# Patient Record
Sex: MALE | Race: WHITE | NOT HISPANIC OR LATINO | Employment: OTHER | URBAN - METROPOLITAN AREA
[De-identification: names, ages, dates, MRNs, and addresses within clinical notes are randomized per-mention and may not be internally consistent; named-entity substitution may affect disease eponyms.]

---

## 2023-10-27 ENCOUNTER — OFFICE VISIT (OUTPATIENT)
Dept: PRIMARY CARE | Facility: CLINIC | Age: 83
End: 2023-10-27
Payer: MEDICARE

## 2023-10-27 VITALS
BODY MASS INDEX: 33.49 KG/M2 | DIASTOLIC BLOOD PRESSURE: 78 MMHG | WEIGHT: 182 LBS | OXYGEN SATURATION: 97 % | HEART RATE: 52 BPM | SYSTOLIC BLOOD PRESSURE: 130 MMHG | TEMPERATURE: 98.3 F | HEIGHT: 62 IN

## 2023-10-27 DIAGNOSIS — E11.69 TYPE 2 DIABETES MELLITUS WITH OTHER SPECIFIED COMPLICATION, UNSPECIFIED WHETHER LONG TERM INSULIN USE (MULTI): Primary | ICD-10-CM

## 2023-10-27 PROBLEM — K76.0 FATTY LIVER: Status: ACTIVE | Noted: 2023-10-27

## 2023-10-27 PROBLEM — I10 HTN (HYPERTENSION): Status: ACTIVE | Noted: 2023-10-27

## 2023-10-27 PROBLEM — R60.9 EDEMA: Status: ACTIVE | Noted: 2023-10-27

## 2023-10-27 PROBLEM — K21.9 GERD (GASTROESOPHAGEAL REFLUX DISEASE): Status: ACTIVE | Noted: 2023-10-27

## 2023-10-27 PROBLEM — R20.2 NUMBNESS AND TINGLING: Status: ACTIVE | Noted: 2023-10-27

## 2023-10-27 PROBLEM — R79.89 LOW VITAMIN B12 LEVEL: Status: ACTIVE | Noted: 2023-10-27

## 2023-10-27 PROBLEM — M25.562 KNEE PAIN, LEFT: Status: ACTIVE | Noted: 2023-10-27

## 2023-10-27 PROBLEM — G56.00 CARPAL TUNNEL SYNDROME: Status: ACTIVE | Noted: 2023-10-27

## 2023-10-27 PROBLEM — J43.9 EMPHYSEMA/COPD (MULTI): Status: ACTIVE | Noted: 2023-10-27

## 2023-10-27 PROBLEM — E11.9 TYPE 2 DIABETES MELLITUS (MULTI): Status: ACTIVE | Noted: 2023-10-27

## 2023-10-27 PROBLEM — E78.5 HYPERLIPIDEMIA: Status: ACTIVE | Noted: 2023-10-27

## 2023-10-27 PROBLEM — M54.12 CERVICAL RADICULOPATHY: Status: ACTIVE | Noted: 2023-10-27

## 2023-10-27 PROBLEM — N40.0 BPH (BENIGN PROSTATIC HYPERPLASIA): Status: ACTIVE | Noted: 2023-10-27

## 2023-10-27 PROBLEM — M19.90 OSTEOARTHRITIS: Status: ACTIVE | Noted: 2023-10-27

## 2023-10-27 PROBLEM — G47.30 SLEEP APNEA: Status: ACTIVE | Noted: 2023-10-27

## 2023-10-27 PROBLEM — E55.9 VITAMIN D DEFICIENCY: Status: ACTIVE | Noted: 2023-10-27

## 2023-10-27 PROBLEM — B36.9 FUNGAL SKIN INFECTION: Status: ACTIVE | Noted: 2023-10-27

## 2023-10-27 PROBLEM — R41.3 MEMORY LOSS: Status: ACTIVE | Noted: 2023-10-27

## 2023-10-27 PROBLEM — R20.0 NUMBNESS AND TINGLING: Status: ACTIVE | Noted: 2023-10-27

## 2023-10-27 PROBLEM — M54.50 LOWER BACK PAIN: Status: ACTIVE | Noted: 2023-10-27

## 2023-10-27 PROBLEM — K43.9 HERNIA, VENTRAL: Status: ACTIVE | Noted: 2023-10-27

## 2023-10-27 LAB — POC FINGERSTICK BLOOD GLUCOSE: 112 MG/DL (ref 70–100)

## 2023-10-27 PROCEDURE — 3078F DIAST BP <80 MM HG: CPT | Performed by: INTERNAL MEDICINE

## 2023-10-27 PROCEDURE — 1125F AMNT PAIN NOTED PAIN PRSNT: CPT | Performed by: INTERNAL MEDICINE

## 2023-10-27 PROCEDURE — 1159F MED LIST DOCD IN RCRD: CPT | Performed by: INTERNAL MEDICINE

## 2023-10-27 PROCEDURE — 3075F SYST BP GE 130 - 139MM HG: CPT | Performed by: INTERNAL MEDICINE

## 2023-10-27 PROCEDURE — 82962 GLUCOSE BLOOD TEST: CPT | Performed by: INTERNAL MEDICINE

## 2023-10-27 PROCEDURE — 99213 OFFICE O/P EST LOW 20 MIN: CPT | Performed by: INTERNAL MEDICINE

## 2023-10-27 PROCEDURE — 1036F TOBACCO NON-USER: CPT | Performed by: INTERNAL MEDICINE

## 2023-10-27 RX ORDER — ALBUTEROL SULFATE 0.83 MG/ML
2.5 SOLUTION RESPIRATORY (INHALATION) EVERY 4 HOURS PRN
COMMUNITY

## 2023-10-27 RX ORDER — METFORMIN HYDROCHLORIDE 1000 MG/1
1000 TABLET ORAL
COMMUNITY
Start: 2012-02-15 | End: 2024-03-01 | Stop reason: SDUPTHER

## 2023-10-27 RX ORDER — NYSTATIN 100000 [USP'U]/G
1 POWDER TOPICAL AS NEEDED
COMMUNITY
Start: 2015-12-28

## 2023-10-27 RX ORDER — LISINOPRIL 40 MG/1
40 TABLET ORAL DAILY
COMMUNITY
Start: 2023-10-09

## 2023-10-27 RX ORDER — FENOFIBRIC ACID 135 MG/1
1 CAPSULE, DELAYED RELEASE ORAL DAILY
COMMUNITY
Start: 2022-09-01

## 2023-10-27 RX ORDER — FUROSEMIDE 40 MG/1
40 TABLET ORAL
COMMUNITY
Start: 2023-09-18

## 2023-10-27 RX ORDER — BLOOD SUGAR DIAGNOSTIC
STRIP MISCELLANEOUS
Qty: 100 EACH | Refills: 3 | Status: SHIPPED | OUTPATIENT
Start: 2023-10-27

## 2023-10-27 RX ORDER — BLOOD SUGAR DIAGNOSTIC
STRIP MISCELLANEOUS
COMMUNITY
End: 2023-10-27

## 2023-10-27 RX ORDER — ASPIRIN 81 MG/1
1 TABLET ORAL DAILY
COMMUNITY
End: 2023-12-21 | Stop reason: WASHOUT

## 2023-10-27 RX ORDER — FLUTICASONE FUROATE AND VILANTEROL TRIFENATATE 100; 25 UG/1; UG/1
1 POWDER RESPIRATORY (INHALATION) DAILY
COMMUNITY
Start: 2022-07-06

## 2023-10-27 RX ORDER — DICLOFENAC SODIUM 10 MG/G
4 GEL TOPICAL 4 TIMES DAILY PRN
COMMUNITY
Start: 2023-04-05

## 2023-10-27 RX ORDER — LANCETS
EACH MISCELLANEOUS
Qty: 100 EACH | Refills: 3 | Status: SHIPPED | OUTPATIENT
Start: 2023-10-27

## 2023-10-27 RX ORDER — INSULIN PUMP SYRINGE, 3 ML
EACH MISCELLANEOUS
Qty: 1 EACH | Refills: 0 | Status: SHIPPED | OUTPATIENT
Start: 2023-10-27 | End: 2024-10-26

## 2023-10-27 RX ORDER — ALBUTEROL SULFATE 90 UG/1
2 AEROSOL, METERED RESPIRATORY (INHALATION) EVERY 4 HOURS PRN
COMMUNITY

## 2023-10-27 RX ORDER — BLOOD SUGAR DIAGNOSTIC
STRIP MISCELLANEOUS
Qty: 100 EACH | Refills: 3 | Status: SHIPPED | OUTPATIENT
Start: 2023-10-27 | End: 2023-10-27 | Stop reason: SDUPTHER

## 2023-10-27 ASSESSMENT — PAIN SCALES - GENERAL: PAINLEVEL: 6

## 2023-10-27 NOTE — PROGRESS NOTES
"Subjective   Patient ID: Manny Barajas is a 83 y.o. male who presents for Hyperglycemia (Pt states \"My blood sugars have been high for the past week.\"  ).  Last visit 8/2023.   Gerd 3-4 times a  day but declines medication  States he takes a lot of pills  Sugar 112 at office visit today at home was off the charts.  Denies back pain. Left leg is much better. Sleeping it with up and has less swelling does have left thigh/buttock pain.  ER September. Urgent care wanted to call 911 but patient declined and went to ER. SOB/COPD exacerbation. Treated with steroids. Denies problems with his breathing at this time.     HPI   Past Medical History  DM II  CTS  Chronic knee pain.   OA knee and hip; right hip replacement 12/2021 Dr Mena  GISELA CPAP -not using  COPD Dr Castro  possible TIA, CVA 2016  word finding difficulty: MRI May 2023 encephalomalacia.  Carotid ultrasound less than 50% bilateral, echo EF 55 to 60% impaired relaxation  Fatty liver  HTN  Cervical radiculopathy  cataract  Hyperlipidemia  Renal calculi  Vitamin D and B12 deficiency  Chronic left lower extremity edema. Cellulitis December 2022 ER      Objective   /78 (BP Location: Right arm, Patient Position: Sitting)   Pulse 52   Temp 36.8 °C (98.3 °F)   Ht 1.575 m (5' 2\")   Wt 82.6 kg (182 lb)   SpO2 97%   BMI 33.29 kg/m²     Physical Exam  Appears uncomfortable when changing position (chronic)    LABS  8/2023 bmp sodium 140 potassium 4 creatinine 0.9 glucose elevated 1  8/2023 cmp, A1c, D, cbc  A1c 6.7 creatinine 1.6 , D 35 hemoglobin 14  April 2023 BMP, TSH, RPR  Potassium within goal     March 2023 microalbumin, A1c, CBC, vitamin D, B12, BMP, lipid  Microalbumin ratio 17  A1c 7.7 creatinine 0.9 glucose 152  Vitamin B12 846 vitamin D 33  White blood cell count 6.3 hemoglobin low 13.3 hematocrit 39.7 platelet 274  Cholesterol 146 HDL 32 LDL 88 triglyceride 168     January 2023 BMP sodium 143 potassium 4.7 creatinine 1.2 glucose 172  Objective "   Assessment/Plan   Diagnoses and all orders for this visit:  Type 2 diabetes mellitus with other specified complication, unspecified whether long term insulin use (CMS/[facility])  -     POCT Fingerstick Glucose manually resulted  -     FreeStyle glucose monitoring kit; Use daily to check sugar  -     lancets misc; Use daily to check sugar  -     Albumin , Urine Random; Future  -     Comprehensive Metabolic Panel; Future  -     CBC; Future  -     Lipid Panel; Future  -     FreeStyle Test strip; Use daily to check sugar  Test strips  over 3 years ago (almost 4 years ago  Glucometer is 15 years old  Advised new glucometer/test strips/lancets  If problem with insurance coverage to have pharmacy call office  He is not sure if has labs. Ordered. Advised C209 1 week prior to next appt  GERD, hx HH declines medication.

## 2023-12-11 ENCOUNTER — HOSPITAL ENCOUNTER (OUTPATIENT)
Dept: RADIOLOGY | Facility: CLINIC | Age: 83
Discharge: HOME | End: 2023-12-11
Payer: MEDICARE

## 2023-12-11 ENCOUNTER — OFFICE VISIT (OUTPATIENT)
Dept: URGENT CARE | Facility: CLINIC | Age: 83
End: 2023-12-11
Payer: MEDICARE

## 2023-12-11 VITALS
HEIGHT: 63 IN | SYSTOLIC BLOOD PRESSURE: 143 MMHG | WEIGHT: 176 LBS | OXYGEN SATURATION: 95 % | RESPIRATION RATE: 24 BRPM | HEART RATE: 88 BPM | TEMPERATURE: 97.7 F | DIASTOLIC BLOOD PRESSURE: 80 MMHG | BODY MASS INDEX: 31.18 KG/M2

## 2023-12-11 DIAGNOSIS — I48.91 ATRIAL FIBRILLATION, UNSPECIFIED TYPE (MULTI): Primary | ICD-10-CM

## 2023-12-11 DIAGNOSIS — J44.1 COPD EXACERBATION (MULTI): ICD-10-CM

## 2023-12-11 DIAGNOSIS — R06.00 DYSPNEA, UNSPECIFIED TYPE: ICD-10-CM

## 2023-12-11 PROCEDURE — 3077F SYST BP >= 140 MM HG: CPT | Performed by: PHYSICIAN ASSISTANT

## 2023-12-11 PROCEDURE — 1036F TOBACCO NON-USER: CPT | Performed by: PHYSICIAN ASSISTANT

## 2023-12-11 PROCEDURE — 3079F DIAST BP 80-89 MM HG: CPT | Performed by: PHYSICIAN ASSISTANT

## 2023-12-11 PROCEDURE — 71046 X-RAY EXAM CHEST 2 VIEWS: CPT | Mod: FY

## 2023-12-11 PROCEDURE — 1159F MED LIST DOCD IN RCRD: CPT | Performed by: PHYSICIAN ASSISTANT

## 2023-12-11 PROCEDURE — 1126F AMNT PAIN NOTED NONE PRSNT: CPT | Performed by: PHYSICIAN ASSISTANT

## 2023-12-11 PROCEDURE — 93000 ELECTROCARDIOGRAM COMPLETE: CPT | Performed by: PHYSICIAN ASSISTANT

## 2023-12-11 PROCEDURE — 99215 OFFICE O/P EST HI 40 MIN: CPT | Performed by: PHYSICIAN ASSISTANT

## 2023-12-11 PROCEDURE — 94640 AIRWAY INHALATION TREATMENT: CPT | Performed by: PHYSICIAN ASSISTANT

## 2023-12-11 PROCEDURE — 71046 X-RAY EXAM CHEST 2 VIEWS: CPT | Performed by: RADIOLOGY

## 2023-12-11 PROCEDURE — 36000 PLACE NEEDLE IN VEIN: CPT | Performed by: PHYSICIAN ASSISTANT

## 2023-12-11 RX ORDER — TAMSULOSIN HYDROCHLORIDE 0.4 MG/1
0.4 CAPSULE ORAL DAILY
COMMUNITY
Start: 2023-11-07 | End: 2024-04-01 | Stop reason: SDUPTHER

## 2023-12-11 RX ORDER — ATORVASTATIN CALCIUM 80 MG/1
80 TABLET, FILM COATED ORAL DAILY
COMMUNITY
Start: 2023-11-23

## 2023-12-11 RX ORDER — IPRATROPIUM BROMIDE AND ALBUTEROL SULFATE 2.5; .5 MG/3ML; MG/3ML
3 SOLUTION RESPIRATORY (INHALATION) ONCE
Status: COMPLETED | OUTPATIENT
Start: 2023-12-11 | End: 2023-12-11

## 2023-12-11 RX ADMIN — IPRATROPIUM BROMIDE AND ALBUTEROL SULFATE 3 ML: 2.5; .5 SOLUTION RESPIRATORY (INHALATION) at 10:59

## 2023-12-11 ASSESSMENT — ENCOUNTER SYMPTOMS
APPETITE CHANGE: 0
FEVER: 0
HEMATOLOGIC/LYMPHATIC NEGATIVE: 1
SORE THROAT: 0
WOUND: 0
PSYCHIATRIC NEGATIVE: 1
DIARRHEA: 0
SHORTNESS OF BREATH: 1
EYE REDNESS: 0
FATIGUE: 0
RHINORRHEA: 0
BACK PAIN: 0
ACTIVITY CHANGE: 0
COUGH: 1
ABDOMINAL PAIN: 0
SINUS PRESSURE: 0
ALLERGIC/IMMUNOLOGIC NEGATIVE: 1
COLOR CHANGE: 0
ENDOCRINE NEGATIVE: 1
ARTHRALGIAS: 0
WHEEZING: 1
EYE PAIN: 0
EYE DISCHARGE: 0
NAUSEA: 0
NEUROLOGICAL NEGATIVE: 1
VOMITING: 0
BLOOD IN STOOL: 0

## 2023-12-11 ASSESSMENT — PAIN SCALES - GENERAL: PAINLEVEL: 0-NO PAIN

## 2023-12-11 NOTE — PROGRESS NOTES
"Subjective   Patient ID: Manny Barajas is a 83 y.o. male who presents for Cough (Productive cough x1 week.) and Shortness of Breath (No chest pain. Using oxygen at home.).  Patient with an underlying medical history of diabetes mellitus type 2, obstructive sleep, hypertension, hypercholesterolemia, GERD, fatty liver disease, COPD emphysema, previous CVA in 2020.  Patient notes the cough is minimally productive, he states that symptoms have been worsening over the course the last week.      Of note patient is prescribed nebulizers for at home but states that he does not use these because they do not help.  Patient denies any use of albuterol over the course of last week        Past Medical History:   Diagnosis Date    Other conditions influencing health status 01/17/2020    History of cough       Review of Systems   Constitutional:  Negative for activity change, appetite change, fatigue and fever.   HENT:  Positive for congestion. Negative for rhinorrhea, sinus pressure and sore throat.    Eyes:  Negative for pain, discharge and redness.   Respiratory:  Positive for cough, shortness of breath and wheezing.    Cardiovascular:  Positive for leg swelling. Negative for chest pain.   Gastrointestinal:  Negative for abdominal pain, blood in stool, diarrhea, nausea and vomiting.   Endocrine: Negative.    Musculoskeletal:  Negative for arthralgias, back pain and gait problem.   Skin:  Negative for color change, rash and wound.   Allergic/Immunologic: Negative.    Neurological: Negative.    Hematological: Negative.    Psychiatric/Behavioral: Negative.         Objective   /80   Pulse 88   Temp 36.5 °C (97.7 °F) (Temporal)   Resp 24   Ht 1.6 m (5' 3\")   Wt 79.8 kg (176 lb)   SpO2 95%   BMI 31.18 kg/m²   Physical Exam  Constitutional:       General: He is not in acute distress.     Appearance: Normal appearance. He is not ill-appearing, toxic-appearing or diaphoretic.   HENT:      Head: Normocephalic and " atraumatic.      Nose: No congestion or rhinorrhea.      Mouth/Throat:      Mouth: Mucous membranes are moist.      Pharynx: Oropharynx is clear. No posterior oropharyngeal erythema.   Eyes:      Conjunctiva/sclera: Conjunctivae normal.   Cardiovascular:      Rate and Rhythm: Normal rate and regular rhythm.      Heart sounds: No murmur heard.  Pulmonary:      Effort: Pulmonary effort is normal. No respiratory distress.      Breath sounds: Wheezing present. No rhonchi or rales.   Abdominal:      Tenderness: There is no abdominal tenderness. There is no guarding or rebound.   Musculoskeletal:         General: No swelling, tenderness, deformity or signs of injury. Normal range of motion.      Cervical back: Normal range of motion and neck supple.      Right lower le+ Edema present.      Left lower le+ Edema present.   Skin:     General: Skin is warm and dry.      Findings: No erythema or rash.   Neurological:      General: No focal deficit present.      Mental Status: He is alert and oriented to person, place, and time.      Gait: Gait normal.         Assessment/Plan   Problem List Items Addressed This Visit       COPD exacerbation (CMS/[facility]) - Primary    Relevant Medications    ipratropium-albuteroL (Duo-Neb) 0.5-2.5 mg/3 mL nebulizer solution 3 mL (Completed)    predniSONE (Deltasone) tablet 60 mg (Completed)    Other Relevant Orders    XR chest 2 views    ECG 12 lead (Clinic Performed) (Completed)    Dyspnea    Atrial fibrillation (CMS/[facility])   -Patient with a longstanding history of COPD seen here in urgent care for dyspnea, cough over the course of the last week.    -Chest x-ray without any significant evidence of acute cardiopulmonary process.  The patient received DuoNeb here in office as well as prednisone.    -EKG was obtained and indicates the patient is in atrial fibrillation.  Per patient and floor his daughter who I spoke with by phone he has no known history of atrial fibrillation.  -Patient  initially quite resistant to being transported to the emergency room.  But after having phone conversation with his daughter Steffany at [phone] he is agreeable for transport by EMS.  -Patient transported to EMS as this is apparently new onset A-fib given the patient's underlying medical history he warrants further evaluation and management at the emergency room.

## 2023-12-20 PROBLEM — I63.9 CEREBROVASCULAR ACCIDENT (CVA) (MULTI): Status: ACTIVE | Noted: 2020-08-25

## 2023-12-20 PROBLEM — J44.1 COPD EXACERBATION (MULTI): Status: RESOLVED | Noted: 2023-12-11 | Resolved: 2023-12-20

## 2023-12-20 PROBLEM — N18.9 CHRONIC KIDNEY DISEASE: Status: ACTIVE | Noted: 2023-12-20

## 2023-12-20 PROBLEM — K43.9 HERNIA, VENTRAL: Status: RESOLVED | Noted: 2023-10-27 | Resolved: 2023-12-20

## 2023-12-20 PROBLEM — R06.00 DYSPNEA: Status: RESOLVED | Noted: 2023-12-11 | Resolved: 2023-12-20

## 2023-12-20 NOTE — PROGRESS NOTES
"Chief Complaint   Patient presents with    Follow-up     Pt here for 4 mo FUV and follow up after discharge from [facility] 12/6/23 for A fib.     83 year old man 4 month follow up and hospital follow up  Last appt 8/2023.   Went to urgent care for cough. He indicates they called 911. SW diagnosed with afib.  Has f/up with Dr Marlow 01/08/2024 and 2/01/24  Denies chest pain or palpitations. Taking eliquis daily  Cough is better. Does have shortness of breath. Does not wake him up at night.  Will wake up around 1 am-just cannot sleep  Sugars 179 this am.  Typical 110-115. Denies change in po intake.   Not taking breo-states his daughter is a nurse and did not think it was helping so stopped it.  He does have some at home. He will restart.     Past Medical History  DM II  CTS  Chronic knee pain.   OA knee and hip; right hip replacement 12/2021 Dr Mena  GISELA CPAP -not using  COPD Dr Castro  possible TIA, CVA 2016  word finding difficulty: MRI May 2023 encephalomalacia.  Carotid ultrasound less than 50% bilateral, echo EF 55 to 60% impaired relaxation  Fatty liver  HTN  Cervical radiculopathy  cataract  Hyperlipidemia  Renal calculi  Vitamin D and B12 deficiency  Chronic left lower extremity edema. Cellulitis December 2022 ER    Blood pressure 130/50, pulse 52, temperature 36.3 °C (97.4 °F), height 1.6 m (5' 3\"), weight 82.1 kg (181 lb), SpO2 99 %.  Body mass index is 32.06 kg/m².  8/2023 weight 186 lb.    Vital reviewed. Cane.   Neck: no cervical/clavicular adenopathy  CV: RRR S1 S2 normal. No murmur  Lungs: +scattered wheezing. Breath sounds symmetric  Extremities: +1 pretibial edema  Neuro: speech intact.     12/2023 sw hg 15.2 cr 0.9  troponin negative    9/2023 flu and covid negative  BNP 77  Cmp, cbc: cr 0.9 glucose elevated 123 K 3.6 hg 14.3    8/2023 A1c 6.7 D 35    April 2023 BMP, TSH, RPR  Potassium within goal    March 2023 microalbumin, A1c, CBC, vitamin D, B12, BMP, lipid  Microalbumin ratio 17  A1c 7.7 " creatinine 0.9 glucose 152  Vitamin B12 846 vitamin D 33  White blood cell count 6.3 hemoglobin low 13.3 hematocrit 39.7 platelet 274  Cholesterol 146 HDL 32 LDL 88 triglyceride 168    January 2023 BMP sodium 143 potassium 4.7 creatinine 1.2 glucose 172    12/2022 ER CMP, lactate, BNP, cbc  Sodium 144 potassium 4.1 creatinine 1.1 glucose 175 liver function test negative lactate normal BNP normal at 71  White blood cell count 5.4 hemoglobin 13.7 platelet 206        ASSESSMENT AND PLAN:    DM ordered labs for April  A1c in office today  Afib denies symptoms. Bp within goal. Pt on eliquis. Future labs has f/up with cardiologist.  COPD uncontrolled. Long discussion regarding restarting breo. Had flu vaccine. Declines covid.       Colon cancer screening: did not recommend screen given his age and general health.   Immunizations: needs prevnar 20.

## 2023-12-21 ENCOUNTER — OFFICE VISIT (OUTPATIENT)
Dept: PRIMARY CARE | Facility: CLINIC | Age: 83
End: 2023-12-21
Payer: MEDICARE

## 2023-12-21 VITALS
OXYGEN SATURATION: 99 % | BODY MASS INDEX: 32.07 KG/M2 | SYSTOLIC BLOOD PRESSURE: 130 MMHG | HEIGHT: 63 IN | WEIGHT: 181 LBS | HEART RATE: 52 BPM | DIASTOLIC BLOOD PRESSURE: 50 MMHG | TEMPERATURE: 97.4 F

## 2023-12-21 DIAGNOSIS — E11.69 TYPE 2 DIABETES MELLITUS WITH OTHER SPECIFIED COMPLICATION, WITHOUT LONG-TERM CURRENT USE OF INSULIN (MULTI): Primary | ICD-10-CM

## 2023-12-21 LAB — POC HEMOGLOBIN A1C: 7.4 % (ref 4.2–6.5)

## 2023-12-21 PROCEDURE — 1126F AMNT PAIN NOTED NONE PRSNT: CPT | Performed by: INTERNAL MEDICINE

## 2023-12-21 PROCEDURE — 3075F SYST BP GE 130 - 139MM HG: CPT | Performed by: INTERNAL MEDICINE

## 2023-12-21 PROCEDURE — 99214 OFFICE O/P EST MOD 30 MIN: CPT | Performed by: INTERNAL MEDICINE

## 2023-12-21 PROCEDURE — 1159F MED LIST DOCD IN RCRD: CPT | Performed by: INTERNAL MEDICINE

## 2023-12-21 PROCEDURE — 83036 HEMOGLOBIN GLYCOSYLATED A1C: CPT | Performed by: INTERNAL MEDICINE

## 2023-12-21 PROCEDURE — 1160F RVW MEDS BY RX/DR IN RCRD: CPT | Performed by: INTERNAL MEDICINE

## 2023-12-21 PROCEDURE — 1036F TOBACCO NON-USER: CPT | Performed by: INTERNAL MEDICINE

## 2023-12-21 PROCEDURE — 3078F DIAST BP <80 MM HG: CPT | Performed by: INTERNAL MEDICINE

## 2023-12-21 RX ORDER — APIXABAN 5 MG/1
5 TABLET, FILM COATED ORAL 2 TIMES DAILY
COMMUNITY
Start: 2023-12-12 | End: 2024-04-18 | Stop reason: SDUPTHER

## 2023-12-21 RX ORDER — GLYBURIDE 5 MG/1
1 TABLET ORAL
COMMUNITY
Start: 2014-07-24 | End: 2024-04-18 | Stop reason: WASHOUT

## 2023-12-21 RX ORDER — DILTIAZEM HYDROCHLORIDE 30 MG/1
30 TABLET, COATED ORAL 3 TIMES DAILY
COMMUNITY
Start: 2023-12-12

## 2023-12-21 ASSESSMENT — PATIENT HEALTH QUESTIONNAIRE - PHQ9
1. LITTLE INTEREST OR PLEASURE IN DOING THINGS: NOT AT ALL
SUM OF ALL RESPONSES TO PHQ9 QUESTIONS 1 & 2: 0
2. FEELING DOWN, DEPRESSED OR HOPELESS: NOT AT ALL

## 2023-12-21 ASSESSMENT — PAIN SCALES - GENERAL: PAINLEVEL: 0-NO PAIN

## 2023-12-22 ENCOUNTER — DOCUMENTATION (OUTPATIENT)
Dept: PRIMARY CARE | Facility: CLINIC | Age: 83
End: 2023-12-22
Payer: MEDICARE

## 2023-12-22 ENCOUNTER — TELEPHONE (OUTPATIENT)
Dept: PRIMARY CARE | Facility: CLINIC | Age: 83
End: 2023-12-22
Payer: MEDICARE

## 2023-12-22 NOTE — TELEPHONE ENCOUNTER
Called pt and informed, voiced understanding.  Pt agrees with Metformin dose increase and med list updated.

## 2023-12-22 NOTE — TELEPHONE ENCOUNTER
----- Message from Ramona Beth MD sent at 12/21/2023  5:00 PM EST -----  Hemoglobin A1c is 7.4.  Goal is 7 or less.  This is increased from prior testing.  Recommend try to avoid simple sugars such as a bread potatoes etc.  Also recommend that he take a full pill of metformin twice a day instead of a half a pill twice a day (if agreeable update med list)

## 2024-03-01 ENCOUNTER — TELEPHONE (OUTPATIENT)
Dept: PRIMARY CARE | Facility: CLINIC | Age: 84
End: 2024-03-01
Payer: MEDICARE

## 2024-03-01 DIAGNOSIS — E11.9 TYPE 2 DIABETES MELLITUS WITHOUT COMPLICATION, UNSPECIFIED WHETHER LONG TERM INSULIN USE (MULTI): Primary | ICD-10-CM

## 2024-03-01 RX ORDER — METFORMIN HYDROCHLORIDE 1000 MG/1
1000 TABLET ORAL
Qty: 180 TABLET | Refills: 3 | Status: SHIPPED | OUTPATIENT
Start: 2024-03-01 | End: 2024-05-13 | Stop reason: SDUPTHER

## 2024-03-01 NOTE — TELEPHONE ENCOUNTER
"Received message from pt's daughter \"At his last appointment, Dr. Waggoner increased his Metformin.  I am calling to see if she wants him to continue on the increased dose?\"  (If yes, [facility])  Last OV 12/21/23  Pend OV 4/18/24  "

## 2024-03-04 NOTE — TELEPHONE ENCOUNTER
Called pt's daughter, Steffany, and informed.  Voiced understanding.    Principal Discharge DX:	Forearm fracture

## 2024-04-01 DIAGNOSIS — N40.0 BENIGN PROSTATIC HYPERPLASIA, UNSPECIFIED WHETHER LOWER URINARY TRACT SYMPTOMS PRESENT: ICD-10-CM

## 2024-04-01 RX ORDER — TAMSULOSIN HYDROCHLORIDE 0.4 MG/1
CAPSULE ORAL
Qty: 90 CAPSULE | Refills: 0 | Status: SHIPPED | OUTPATIENT
Start: 2024-04-01

## 2024-04-18 ENCOUNTER — OFFICE VISIT (OUTPATIENT)
Dept: PRIMARY CARE | Facility: CLINIC | Age: 84
End: 2024-04-18
Payer: MEDICARE

## 2024-04-18 VITALS
WEIGHT: 181.4 LBS | SYSTOLIC BLOOD PRESSURE: 120 MMHG | TEMPERATURE: 98 F | DIASTOLIC BLOOD PRESSURE: 60 MMHG | HEIGHT: 63 IN | HEART RATE: 61 BPM | BODY MASS INDEX: 32.14 KG/M2 | OXYGEN SATURATION: 97 %

## 2024-04-18 DIAGNOSIS — I48.91 ATRIAL FIBRILLATION, UNSPECIFIED TYPE (MULTI): Primary | ICD-10-CM

## 2024-04-18 DIAGNOSIS — E11.69 TYPE 2 DIABETES MELLITUS WITH OTHER SPECIFIED COMPLICATION, WITHOUT LONG-TERM CURRENT USE OF INSULIN (MULTI): ICD-10-CM

## 2024-04-18 DIAGNOSIS — J43.9 PULMONARY EMPHYSEMA, UNSPECIFIED EMPHYSEMA TYPE (MULTI): ICD-10-CM

## 2024-04-18 PROCEDURE — 1036F TOBACCO NON-USER: CPT | Performed by: INTERNAL MEDICINE

## 2024-04-18 PROCEDURE — 1125F AMNT PAIN NOTED PAIN PRSNT: CPT | Performed by: INTERNAL MEDICINE

## 2024-04-18 PROCEDURE — 3078F DIAST BP <80 MM HG: CPT | Performed by: INTERNAL MEDICINE

## 2024-04-18 PROCEDURE — 3074F SYST BP LT 130 MM HG: CPT | Performed by: INTERNAL MEDICINE

## 2024-04-18 PROCEDURE — 1159F MED LIST DOCD IN RCRD: CPT | Performed by: INTERNAL MEDICINE

## 2024-04-18 PROCEDURE — 99214 OFFICE O/P EST MOD 30 MIN: CPT | Performed by: INTERNAL MEDICINE

## 2024-04-18 RX ORDER — APIXABAN 5 MG/1
5 TABLET, FILM COATED ORAL 2 TIMES DAILY
Qty: 42 TABLET | Refills: 0 | COMMUNITY
Start: 2024-04-18

## 2024-04-18 RX ORDER — ASPIRIN 81 MG/1
81 TABLET ORAL DAILY
COMMUNITY
End: 2024-04-30

## 2024-04-18 RX ORDER — FENOFIBRATE 134 MG/1
134 CAPSULE ORAL
COMMUNITY
End: 2024-04-18 | Stop reason: SDUPTHER

## 2024-04-18 RX ORDER — DILTIAZEM HYDROCHLORIDE 120 MG/1
120 CAPSULE, EXTENDED RELEASE ORAL DAILY
COMMUNITY
Start: 2024-02-01

## 2024-04-18 ASSESSMENT — PATIENT HEALTH QUESTIONNAIRE - PHQ9
2. FEELING DOWN, DEPRESSED OR HOPELESS: NOT AT ALL
SUM OF ALL RESPONSES TO PHQ9 QUESTIONS 1 & 2: 0
1. LITTLE INTEREST OR PLEASURE IN DOING THINGS: NOT AT ALL

## 2024-04-18 ASSESSMENT — PAIN SCALES - GENERAL: PAINLEVEL: 2

## 2024-04-18 NOTE — PROGRESS NOTES
"Chief Complaint   Patient presents with    Follow-up     Pt here for 4 mo FUV     Last visit 12/2023    84 year old man  At last visit discussed dx with afib. Cannot afford eliquis. Advised to check with Dr Doss  Sugar 116. Denies palpitations. Does have sob.   Not using breo inhaler. Does have nebulizer.   Up to date eye exams.       Patient Active Problem List   Diagnosis    BPH (benign prostatic hyperplasia)    Cervical radiculopathy    Carpal tunnel syndrome    Edema    Emphysema/COPD (Multi)    Fatty liver    Fungal skin infection    GERD (gastroesophageal reflux disease)    HTN (hypertension)    Low vitamin B12 level    Lower back pain    Memory loss    Numbness and tingling    Osteoarthritis    Sleep apnea    Type 2 diabetes mellitus (Multi)    Vitamin D deficiency    Hyperlipidemia    Knee pain, left    Atrial fibrillation (Multi)    Cerebrovascular accident (CVA) (Multi)    Chronic kidney disease         Blood pressure 120/60, pulse 61, temperature 36.7 °C (98 °F), height 1.6 m (5' 3\"), weight 82.3 kg (181 lb 6.4 oz), SpO2 97%.  Body mass index is 32.13 kg/m².    Vital reviewed. Cane.   Grimaces in pain sometimes when changing position  Able to go up exam table.     CV: RRR S1 S2 normal. No murmur.   Lungs: slight wheeze. No crackles.   Extremities: +1 pretibial edema symmetric  Neuro: speech intact.     12/2023 A1c 7.4    12/2023 sw hg 15.2 cr 0.9  troponin negative    9/2023 flu and covid negative  BNP 77  Cmp, cbc: cr 0.9 glucose elevated 123 K 3.6 hg 14.3    8/2023 A1c 6.7 D 35    April 2023 BMP, TSH, RPR  Potassium within goal    March 2023 microalbumin, A1c, CBC, vitamin D, B12, BMP, lipid  Microalbumin ratio 17  A1c 7.7 creatinine 0.9 glucose 152  Vitamin B12 846 vitamin D 33  White blood cell count 6.3 hemoglobin low 13.3 hematocrit 39.7 platelet 274  Cholesterol 146 HDL 32 LDL 88 triglyceride 168    January 2023 BMP sodium 143 potassium 4.7 creatinine 1.2 glucose 172    12/2022 ER CMP, lactate, " BNP, cbc  Sodium 144 potassium 4.1 creatinine 1.1 glucose 175 liver function test negative lactate normal BNP normal at 71  White blood cell count 5.4 hemoglobin 13.7 platelet 206        ASSESSMENT AND PLAN:    1. Atrial fibrillation, unspecified type (Multi)  Advised to call cardiologist and let him know cannot afford eliquis. Discussed with pt coumadin may be an option    2. Type 2 diabetes mellitus with other specified complication, without long-term current use of insulin (Multi)  Obtain labs as previously ordered    3. Pulmonary emphysema, unspecified emphysema type (Multi)  Advised to use breo    Advised to bring in all medications to next office visit. He brought in a list from 5/2023 which was outdated.           Current Outpatient Medications on File Prior to Visit   Medication Sig Dispense Refill    albuterol 2.5 mg /3 mL (0.083 %) nebulizer solution Take 3 mL (2.5 mg) by nebulization every 4 hours if needed.      albuterol 90 mcg/actuation inhaler Inhale 2 puffs every 4 hours if needed.      aspirin 81 mg EC tablet Take 1 tablet (81 mg) by mouth once daily.      atorvastatin (Lipitor) 80 mg tablet Take 1 tablet (80 mg) by mouth once daily.      Breo Ellipta 100-25 mcg/dose inhaler Inhale 1 puff once daily.      Cardizem 30 mg immediate release tablet Take 1 tablet (30 mg) by mouth 3 times a day.      choline fenofibrate (Trilipix) 135 mg DR capsule Take 1 capsule (135 mg) by mouth once daily.      diclofenac sodium (Voltaren) 1 % gel gel Apply 4.5 inches (4 g) topically 4 times a day as needed.      DILT- mg 24 hr capsule Take 1 capsule (120 mg) by mouth once daily.      Eliquis 5 mg tablet Take 1 tablet (5 mg) by mouth 2 times a day.      FreeStyle glucose monitoring kit Use daily to check sugar 1 each 0    FreeStyle Test strip Use daily to check sugar 100 each 3    lancets misc Use daily to check sugar 100 each 3    Lasix 40 mg tablet Take 1 tablet (40 mg) by mouth. On Wednesday and Sunday if  needed for edema      lisinopril 40 mg tablet Take 1 tablet (40 mg) by mouth once daily.      metFORMIN (Glucophage) 1,000 mg tablet Take 1 tablet (1,000 mg) by mouth 2 times a day with meals. 180 tablet 3    nystatin (Mycostatin) 100,000 unit/gram powder Apply 1 Application topically if needed. Apply 3-4 times daily prn      tamsulosin (Flomax) 0.4 mg 24 hr capsule 1 capsule (0.4 mg) once daily. 90 capsule 0    [DISCONTINUED] fenofibrate micronized (Lofibra) 134 mg capsule Take 1 capsule (134 mg) by mouth once daily with breakfast.      [DISCONTINUED] glyBURIDE (Diabeta) 5 mg tablet Take 1 tablet (5 mg) by mouth 2 times a day with meals.       No current facility-administered medications on file prior to visit.

## 2024-04-22 LAB
NON-UH HIE A/G RATIO: 1.3
NON-UH HIE ALB: 3.8 G/DL (ref 3.4–5)
NON-UH HIE ALK PHOS: 47 UNIT/L (ref 45–117)
NON-UH HIE BILIRUBIN, TOTAL: 0.6 MG/DL (ref 0.3–1.2)
NON-UH HIE BUN/CREAT RATIO: 17.8
NON-UH HIE BUN: 16 MG/DL (ref 9–23)
NON-UH HIE CALCIUM: 9.4 MG/DL (ref 8.7–10.4)
NON-UH HIE CALCULATED LDL CHOLESTEROL: 88 MG/DL (ref 60–130)
NON-UH HIE CALCULATED OSMOLALITY: 287 MOSM/KG (ref 275–295)
NON-UH HIE CHLORIDE: 112 MMOL/L (ref 98–107)
NON-UH HIE CHOLESTEROL: 156 MG/DL (ref 100–200)
NON-UH HIE CO2, VENOUS: 23 MMOL/L (ref 20–31)
NON-UH HIE CREATININE, URINE MG/DL: 65.1 MG/DL
NON-UH HIE CREATININE: 0.9 MG/DL (ref 0.6–1.1)
NON-UH HIE GFR AA: >60
NON-UH HIE GLOBULIN: 2.9 G/DL
NON-UH HIE GLOMERULAR FILTRATION RATE: >60 ML/MIN/1.73M?
NON-UH HIE GLUCOSE: 116 MG/DL (ref 74–106)
NON-UH HIE GOT: 33 UNIT/L (ref 15–37)
NON-UH HIE GPT: 37 UNIT/L (ref 10–49)
NON-UH HIE HCT: 43.1 % (ref 41–52)
NON-UH HIE HDL CHOLESTEROL: 50 MG/DL (ref 40–60)
NON-UH HIE HGB A1C: 6.5 %
NON-UH HIE HGB: 14.5 G/DL (ref 13.5–17.5)
NON-UH HIE INSTR WBC ND: 5.5
NON-UH HIE K: 4 MMOL/L (ref 3.5–5.1)
NON-UH HIE MCH: 29.3 PG (ref 27–34)
NON-UH HIE MCHC: 33.8 G/DL (ref 32–37)
NON-UH HIE MCV: 86.8 FL (ref 80–100)
NON-UH HIE MICROALBUMIN, URINE MG/L: 27 MG/L
NON-UH HIE MICROALBUMIN/CREATININE RATIO: 42 MG MALB/GM CREAT (ref 0–30)
NON-UH HIE MPV: 9.8 FL (ref 7.4–10.4)
NON-UH HIE NA: 143 MMOL/L (ref 135–145)
NON-UH HIE PLATELET: 167 X10 (ref 150–450)
NON-UH HIE RBC: 4.96 X10 (ref 4.7–6.1)
NON-UH HIE RDW: 14.3 % (ref 11.5–14.5)
NON-UH HIE TOTAL CHOL/HDL CHOL RATIO: 3.1
NON-UH HIE TOTAL PROTEIN: 6.7 G/DL (ref 5.7–8.2)
NON-UH HIE TRIGLYCERIDES: 89 MG/DL (ref 30–150)
NON-UH HIE WBC: 5.5 X10 (ref 4.5–11)

## 2024-04-23 ENCOUNTER — TELEPHONE (OUTPATIENT)
Dept: PRIMARY CARE | Facility: CLINIC | Age: 84
End: 2024-04-23
Payer: MEDICARE

## 2024-04-23 NOTE — TELEPHONE ENCOUNTER
----- Message from Ramona Beht MD sent at 4/22/2024  5:35 PM EDT -----  A1c is 6.5.  This is very good.  Blood test to check kidney function is within goal.  However urine test does show that diabetes is affecting kidney function.  He is on lisinopril to try and help protect the kidneys from the diabetes.  Recommend that he continue this.  Will review labs at next appointment

## 2024-05-13 ENCOUNTER — TELEPHONE (OUTPATIENT)
Dept: PRIMARY CARE | Facility: CLINIC | Age: 84
End: 2024-05-13
Payer: MEDICARE

## 2024-05-13 DIAGNOSIS — E11.9 TYPE 2 DIABETES MELLITUS WITHOUT COMPLICATION, UNSPECIFIED WHETHER LONG TERM INSULIN USE (MULTI): ICD-10-CM

## 2024-05-13 RX ORDER — METFORMIN HYDROCHLORIDE 1000 MG/1
1000 TABLET ORAL
Qty: 180 TABLET | Refills: 3 | Status: SHIPPED | OUTPATIENT
Start: 2024-05-13

## 2024-05-13 NOTE — TELEPHONE ENCOUNTER
"Received message from pt's daughter, Gemini \"I am calling to see if my father is supposed to continue taking the Metformin at the increased dose 1000 MG twice daily?  The last refill I received from [facility] was for 1000 MG one half tablet.  I just need to know what dose he is supposed to be taking.  I called [facility] and they do not have a new script.\"  Called [facility] and spoke with Niesha Mora (pharmacy tech) who states the rx for 1000 MG twice daily that was sent on 3/1/24 was canceled and they do not have an rx for this dose.  They will need a new script.  (Unable to tell who canceled the scripts)  "

## 2024-06-18 DIAGNOSIS — N40.0 BENIGN PROSTATIC HYPERPLASIA, UNSPECIFIED WHETHER LOWER URINARY TRACT SYMPTOMS PRESENT: ICD-10-CM

## 2024-06-19 RX ORDER — TAMSULOSIN HYDROCHLORIDE 0.4 MG/1
CAPSULE ORAL
Qty: 90 CAPSULE | Refills: 3 | Status: SHIPPED | OUTPATIENT
Start: 2024-06-19

## 2024-07-17 ENCOUNTER — TELEPHONE (OUTPATIENT)
Dept: PRIMARY CARE | Facility: CLINIC | Age: 84
End: 2024-07-17
Payer: MEDICARE

## 2024-07-17 DIAGNOSIS — M19.90 OSTEOARTHRITIS, UNSPECIFIED OSTEOARTHRITIS TYPE, UNSPECIFIED SITE: Primary | ICD-10-CM

## 2024-08-19 DIAGNOSIS — E78.5 HYPERLIPIDEMIA, UNSPECIFIED HYPERLIPIDEMIA TYPE: Primary | ICD-10-CM

## 2024-08-19 RX ORDER — ATORVASTATIN CALCIUM 80 MG/1
80 TABLET, FILM COATED ORAL NIGHTLY
Qty: 90 TABLET | Refills: 3 | Status: SHIPPED | OUTPATIENT
Start: 2024-08-19

## 2024-08-21 ENCOUNTER — APPOINTMENT (OUTPATIENT)
Dept: PRIMARY CARE | Facility: CLINIC | Age: 84
End: 2024-08-21
Payer: COMMERCIAL

## 2024-08-21 VITALS
BODY MASS INDEX: 32.82 KG/M2 | TEMPERATURE: 98 F | OXYGEN SATURATION: 98 % | HEIGHT: 63 IN | SYSTOLIC BLOOD PRESSURE: 132 MMHG | HEART RATE: 85 BPM | WEIGHT: 185.2 LBS | DIASTOLIC BLOOD PRESSURE: 70 MMHG

## 2024-08-21 DIAGNOSIS — R79.89 LOW VITAMIN B12 LEVEL: ICD-10-CM

## 2024-08-21 DIAGNOSIS — E78.5 HYPERLIPIDEMIA, UNSPECIFIED HYPERLIPIDEMIA TYPE: ICD-10-CM

## 2024-08-21 DIAGNOSIS — E11.69 TYPE 2 DIABETES MELLITUS WITH OTHER SPECIFIED COMPLICATION, WITHOUT LONG-TERM CURRENT USE OF INSULIN (MULTI): ICD-10-CM

## 2024-08-21 DIAGNOSIS — Z00.00 ROUTINE GENERAL MEDICAL EXAMINATION AT HEALTH CARE FACILITY: ICD-10-CM

## 2024-08-21 DIAGNOSIS — E55.9 VITAMIN D DEFICIENCY: ICD-10-CM

## 2024-08-21 DIAGNOSIS — M19.90 OSTEOARTHRITIS, UNSPECIFIED OSTEOARTHRITIS TYPE, UNSPECIFIED SITE: ICD-10-CM

## 2024-08-21 DIAGNOSIS — I10 HYPERTENSION, UNSPECIFIED TYPE: Primary | ICD-10-CM

## 2024-08-21 LAB — POC HEMOGLOBIN A1C: 6.6 % (ref 4.2–6.5)

## 2024-08-21 PROCEDURE — 3075F SYST BP GE 130 - 139MM HG: CPT | Performed by: INTERNAL MEDICINE

## 2024-08-21 PROCEDURE — 99213 OFFICE O/P EST LOW 20 MIN: CPT | Performed by: INTERNAL MEDICINE

## 2024-08-21 PROCEDURE — 83036 HEMOGLOBIN GLYCOSYLATED A1C: CPT | Performed by: INTERNAL MEDICINE

## 2024-08-21 PROCEDURE — 1036F TOBACCO NON-USER: CPT | Performed by: INTERNAL MEDICINE

## 2024-08-21 PROCEDURE — 1159F MED LIST DOCD IN RCRD: CPT | Performed by: INTERNAL MEDICINE

## 2024-08-21 PROCEDURE — 1160F RVW MEDS BY RX/DR IN RCRD: CPT | Performed by: INTERNAL MEDICINE

## 2024-08-21 PROCEDURE — 3078F DIAST BP <80 MM HG: CPT | Performed by: INTERNAL MEDICINE

## 2024-08-21 PROCEDURE — 1158F ADVNC CARE PLAN TLK DOCD: CPT | Performed by: INTERNAL MEDICINE

## 2024-08-21 PROCEDURE — 1170F FXNL STATUS ASSESSED: CPT | Performed by: INTERNAL MEDICINE

## 2024-08-21 PROCEDURE — 1123F ACP DISCUSS/DSCN MKR DOCD: CPT | Performed by: INTERNAL MEDICINE

## 2024-08-21 PROCEDURE — G0439 PPPS, SUBSEQ VISIT: HCPCS | Performed by: INTERNAL MEDICINE

## 2024-08-21 PROCEDURE — 1126F AMNT PAIN NOTED NONE PRSNT: CPT | Performed by: INTERNAL MEDICINE

## 2024-08-21 ASSESSMENT — LIFESTYLE VARIABLES
HOW MANY STANDARD DRINKS CONTAINING ALCOHOL DO YOU HAVE ON A TYPICAL DAY: PATIENT DOES NOT DRINK
AUDIT-C TOTAL SCORE: 0
HOW OFTEN DO YOU HAVE SIX OR MORE DRINKS ON ONE OCCASION: NEVER
HOW OFTEN DO YOU HAVE A DRINK CONTAINING ALCOHOL: NEVER
SKIP TO QUESTIONS 9-10: 1

## 2024-08-21 ASSESSMENT — PATIENT HEALTH QUESTIONNAIRE - PHQ9
2. FEELING DOWN, DEPRESSED OR HOPELESS: NOT AT ALL
SUM OF ALL RESPONSES TO PHQ9 QUESTIONS 1 AND 2: 0
1. LITTLE INTEREST OR PLEASURE IN DOING THINGS: NOT AT ALL

## 2024-08-21 ASSESSMENT — ACTIVITIES OF DAILY LIVING (ADL)
GROCERY_SHOPPING: INDEPENDENT
BATHING: INDEPENDENT
MANAGING_FINANCES: INDEPENDENT
DRESSING: INDEPENDENT
TAKING_MEDICATION: INDEPENDENT
DOING_HOUSEWORK: INDEPENDENT

## 2024-08-21 ASSESSMENT — PAIN SCALES - GENERAL: PAINLEVEL: 0-NO PAIN

## 2024-08-21 NOTE — PROGRESS NOTES
"Chief Complaint   Patient presents with    Medicare Annual Wellness Visit Subsequent     Pt here for AWV and 4 mo FUV.  Pt needs renewal on disability placard.       84 y.o. for AWV and 4 month follow up.   Chronic sob. Not always using breo.   Sees Dr Castro  Needs placard and rx for shoes  Chronic LE edema left leg always worse than right.    Last visit 04/2024   Past Medical History  DM II  CTS  Chronic knee pain.   OA knee and hip; right hip replacement 12/2021 Dr Mena  GISELA CPAP -not using  COPD Dr Castro  possible TIA, CVA 2016  word finding difficulty: MRI May 2023 encephalomalacia.  Carotid ultrasound less than 50% bilateral, echo EF 55 to 60% impaired relaxation  Fatty liver  HTN  Cervical radiculopathy  cataract  Hyperlipidemia  Renal calculi  Vitamin D and B12 deficiency  Chronic left lower extremity edema. Cellulitis December 2022 ER      Blood pressure 132/70, pulse 85, temperature 36.7 °C (98 °F), height 1.6 m (5' 3\"), weight 84 kg (185 lb 3.2 oz), SpO2 98%.  Body mass index is 32.81 kg/m².    Physical Examination  General: NAD. Alert.   HEENT: Normocephalic atraumatic.    Eyes: no scleral icterus. Extraocular movements intact.  Pupils equal round and reactive to light.  Ears: TM intact.  No cerumen right +cerumen left. Hearing grossly intact.   Throat: No exudate upper dentures no teeth lower  Neck:  Supple. No thyroid goiter.  Lymph nodes: No cervical or clavicular adenopathy  Cardiovascular: Regular rate rhythm S1-S2 normal no murmur. No carotid bruit.   Lungs: Clear to Auscultation without wheezing, rales, or rhonchi, Breath sounds symmetric. No use of accessory muscles  Abdomen:  Normoactive bowel sounds, soft, non-tender. No mass.  No organomegaly addendum +large hernia  Extremities: +2 pretibial edema left > right  Neuro: no facial asymmetry. Speech intact  Skin: no rash noted  Vascular: DP pulses intact.   Feet bunion, hammer toes    Labs 4/2024  lipid, MA, cmp,   A1c, cbc  LDL 88 Cr 0.9 hg " 14.5 MA ratio 42    Lab Results   Component Value Date    HGBA1C 7.4 (A) 12/21/2023 12/2023 A1c 7.4     12/2023 sw hg 15.2 cr 0.9  troponin negative     9/2023 flu and covid negative  BNP 77  Cmp, cbc: cr 0.9 glucose elevated 123 K 3.6 hg 14.3     8/2023 A1c 6.7 D 35    ASSESSMENT/PLAN  AWV  Living Will: has a living will  Cognition: intact  Men: PSA (Age >50) did not recommend screen   Colon cancer screening 45 and older: did not recommend screen  Depression screen:  denies    If Smoker/Former smoker: screen US aorta (man 65-75)  n/a  Age 50-75, 20 pack year history, quit within 15 years or currently smoking  (medicare 55-77, 30 pack year) n/a  1. Hypertension, unspecified type  - Basic Metabolic Panel; Future    2. Hyperlipidemia, unspecified hyperlipidemia type  - Basic Metabolic Panel; Future  - Lipid Panel; Future    3. Type 2 diabetes mellitus with other specified complication, without long-term current use of insulin (Multi)  - POCT glycosylated hemoglobin (Hb A1C) manually resulted  - Basic Metabolic Panel; Future  - Hemoglobin A1C; Future  - CBC; Future  - Lipid Panel; Future    4. Low vitamin B12 level    - Vitamin B12; Future    5. Vitamin D deficiency  - Vitamin D 25-Hydroxy,Total (for eval of Vitamin D levels); Future    6. Osteoarthritis, unspecified osteoarthritis type, unspecified site    - Disability Placard    7. Routine [facility] medical examination at [facility]        There is no immunization history on file for this patient.        Current Outpatient Medications on File Prior to Visit   Medication Sig Dispense Refill    albuterol 2.5 mg /3 mL (0.083 %) nebulizer solution Take 3 mL (2.5 mg) by nebulization every 4 hours if needed.      albuterol 90 mcg/actuation inhaler Inhale 2 puffs every 4 hours if needed.      atorvastatin (Lipitor) 80 mg tablet TAKE 1 TABLET DAILY AT BEDTIME 90 tablet 3    Breo Ellipta 100-25 mcg/dose inhaler Inhale 1 puff once daily.      choline fenofibrate  (Trilipix) 135 mg DR capsule Take 1 capsule (135 mg) by mouth once daily.      diclofenac sodium (Voltaren) 1 % gel gel Apply 4.5 inches (4 g) topically 4 times a day as needed.      DILT- mg 24 hr capsule Take 1 capsule (120 mg) by mouth once daily.      Eliquis 5 mg tablet Take 1 tablet (5 mg) by mouth 2 times a day. 42 tablet 0    FreeStyle glucose monitoring kit Use daily to check sugar 1 each 0    FreeStyle Test strip Use daily to check sugar 100 each 3    lancets misc Use daily to check sugar 100 each 3    Lasix 40 mg tablet Take 1 tablet (40 mg) by mouth. On Wednesday and Sunday if needed for edema      lisinopril 40 mg tablet Take 1 tablet (40 mg) by mouth once daily.      metFORMIN (Glucophage) 1,000 mg tablet Take 1 tablet (1,000 mg) by mouth 2 times a day with meals. 180 tablet 3    nystatin (Mycostatin) 100,000 unit/gram powder Apply 1 Application topically if needed. Apply 3-4 times daily prn      tamsulosin (Flomax) 0.4 mg 24 hr capsule TAKE 1 CAPSULE DAILY 90 capsule 3    Cardizem 30 mg immediate release tablet Take 1 tablet (30 mg) by mouth 3 times a day.      [DISCONTINUED] atorvastatin (Lipitor) 80 mg tablet Take 1 tablet (80 mg) by mouth once daily.       No current facility-administered medications on file prior to visit.

## 2024-10-03 DIAGNOSIS — I10 HYPERTENSION, UNSPECIFIED TYPE: Primary | ICD-10-CM

## 2024-10-03 RX ORDER — LISINOPRIL 40 MG/1
40 TABLET ORAL DAILY
Qty: 90 TABLET | Refills: 3 | Status: SHIPPED | OUTPATIENT
Start: 2024-10-03

## 2024-10-15 ENCOUNTER — HOSPITAL ENCOUNTER (OUTPATIENT)
Dept: RADIOLOGY | Facility: CLINIC | Age: 84
Discharge: HOME | End: 2024-10-15
Payer: MEDICARE

## 2024-10-15 ENCOUNTER — TELEPHONE (OUTPATIENT)
Dept: PRIMARY CARE | Facility: CLINIC | Age: 84
End: 2024-10-15

## 2024-10-15 ENCOUNTER — OFFICE VISIT (OUTPATIENT)
Dept: PRIMARY CARE | Facility: CLINIC | Age: 84
End: 2024-10-15
Payer: MEDICARE

## 2024-10-15 VITALS
DIASTOLIC BLOOD PRESSURE: 80 MMHG | TEMPERATURE: 97.9 F | HEART RATE: 84 BPM | OXYGEN SATURATION: 94 % | HEIGHT: 63 IN | WEIGHT: 180 LBS | SYSTOLIC BLOOD PRESSURE: 118 MMHG | BODY MASS INDEX: 31.89 KG/M2

## 2024-10-15 DIAGNOSIS — R05.1 ACUTE COUGH: ICD-10-CM

## 2024-10-15 DIAGNOSIS — R06.02 SHORTNESS OF BREATH: ICD-10-CM

## 2024-10-15 DIAGNOSIS — R05.1 ACUTE COUGH: Primary | ICD-10-CM

## 2024-10-15 DIAGNOSIS — J43.9 PULMONARY EMPHYSEMA, UNSPECIFIED EMPHYSEMA TYPE (MULTI): ICD-10-CM

## 2024-10-15 DIAGNOSIS — J40 BRONCHITIS: ICD-10-CM

## 2024-10-15 DIAGNOSIS — I10 HYPERTENSION, UNSPECIFIED TYPE: ICD-10-CM

## 2024-10-15 DIAGNOSIS — I48.91 ATRIAL FIBRILLATION, UNSPECIFIED TYPE (MULTI): ICD-10-CM

## 2024-10-15 PROBLEM — E66.9 OBESITY WITH BODY MASS INDEX 30 OR GREATER: Status: ACTIVE | Noted: 2024-10-15

## 2024-10-15 PROBLEM — M79.643 PAIN OF HAND: Status: ACTIVE | Noted: 2024-10-15

## 2024-10-15 PROBLEM — R10.9 ABDOMINAL PAIN: Status: ACTIVE | Noted: 2024-10-15

## 2024-10-15 PROBLEM — R47.89 WORD FINDING DIFFICULTY: Status: ACTIVE | Noted: 2024-10-15

## 2024-10-15 PROBLEM — R90.89 MAGNETIC RESONANCE IMAGING OF BRAIN ABNORMAL: Status: ACTIVE | Noted: 2024-10-15

## 2024-10-15 PROBLEM — J96.20 ACUTE ON CHRONIC RESPIRATORY FAILURE (MULTI): Status: ACTIVE | Noted: 2024-10-15

## 2024-10-15 PROBLEM — I80.3: Status: ACTIVE | Noted: 2024-10-15

## 2024-10-15 PROBLEM — L03.221 CELLULITIS OF NECK: Status: ACTIVE | Noted: 2024-10-15

## 2024-10-15 PROCEDURE — 71046 X-RAY EXAM CHEST 2 VIEWS: CPT

## 2024-10-15 PROCEDURE — 1123F ACP DISCUSS/DSCN MKR DOCD: CPT | Performed by: INTERNAL MEDICINE

## 2024-10-15 PROCEDURE — 3074F SYST BP LT 130 MM HG: CPT | Performed by: INTERNAL MEDICINE

## 2024-10-15 PROCEDURE — 1036F TOBACCO NON-USER: CPT | Performed by: INTERNAL MEDICINE

## 2024-10-15 PROCEDURE — 99214 OFFICE O/P EST MOD 30 MIN: CPT | Performed by: INTERNAL MEDICINE

## 2024-10-15 PROCEDURE — 1159F MED LIST DOCD IN RCRD: CPT | Performed by: INTERNAL MEDICINE

## 2024-10-15 PROCEDURE — 3079F DIAST BP 80-89 MM HG: CPT | Performed by: INTERNAL MEDICINE

## 2024-10-15 PROCEDURE — 71046 X-RAY EXAM CHEST 2 VIEWS: CPT | Performed by: RADIOLOGY

## 2024-10-15 RX ORDER — AZITHROMYCIN 250 MG/1
TABLET, FILM COATED ORAL
Qty: 6 TABLET | Refills: 0 | Status: SHIPPED | OUTPATIENT
Start: 2024-10-15 | End: 2024-10-20

## 2024-10-15 RX ORDER — METHYLPREDNISOLONE 4 MG/1
TABLET ORAL
Qty: 21 TABLET | Refills: 0 | Status: SHIPPED | OUTPATIENT
Start: 2024-10-15 | End: 2024-10-22

## 2024-10-15 ASSESSMENT — ENCOUNTER SYMPTOMS
OCCASIONAL FEELINGS OF UNSTEADINESS: 0
LOSS OF SENSATION IN FEET: 0
DEPRESSION: 0

## 2024-10-15 ASSESSMENT — PATIENT HEALTH QUESTIONNAIRE - PHQ9
SUM OF ALL RESPONSES TO PHQ9 QUESTIONS 1 & 2: 0
1. LITTLE INTEREST OR PLEASURE IN DOING THINGS: NOT AT ALL
2. FEELING DOWN, DEPRESSED OR HOPELESS: NOT AT ALL

## 2024-10-15 NOTE — TELEPHONE ENCOUNTER
Talked with patients daughter, she is aware of results, meds and appts.     ----- Message from Adis Ye sent at 10/15/2024  1:23 PM EDT -----  I called the patient chest x-ray does not show pneumonia.  I am going to send a course of azithromycin for possible acute bronchitis.  Also Medrol Dosepak is prescribed for wheezing and shortness of breath.  Advised the patient to follow-up with his regular primary care physician in 1 to 2 weeks.  X-ray shows enlarged heart, advised the patient to keep follow-up appointment with his cardiologist  ----- Message -----  From: Interface, Radiology Results In  Sent: 10/15/2024   1:10 PM EDT  To: Adis Ye MD

## 2024-10-15 NOTE — PROGRESS NOTES
Internal Medicine Outpatient Visit  Chief Complaint   Patient presents with    Cough     Cough and yellow phlegm for a month or so.        HPI: Manny Barajas is of 84 y.o. who is here for Internal Visit for the following issues:  Patient is seen office today with chief complaint of cough and shortness of breath and some wheezing for almost a month is getting worse so he decided to come to the office.  He denies any fever or chill.  Has no chest pain or palpitation.  Has no nausea matting pain abdomen.  Has no weakness of any extremity.  He has some wheezing normally off-and-on from his COPD.  He has chronic leg swelling.  He is chronically anticoagulated.  Review of other systems are negative.    Past Surgical History:   Procedure Laterality Date    OTHER SURGICAL HISTORY  03/09/2022    Hip replacement    OTHER SURGICAL HISTORY  10/13/2021    Carpal tunnel surgery    OTHER SURGICAL HISTORY  10/13/2021    Hemorrhoidectomy    OTHER SURGICAL HISTORY  10/13/2021    Rotator cuff repair    OTHER SURGICAL HISTORY  10/13/2021    Humerus fracture repair       No family history on file.      Current Outpatient Medications on File Prior to Visit   Medication Sig Dispense Refill    albuterol 2.5 mg /3 mL (0.083 %) nebulizer solution Take 3 mL (2.5 mg) by nebulization every 4 hours if needed.      albuterol 90 mcg/actuation inhaler Inhale 2 puffs every 4 hours if needed.      atorvastatin (Lipitor) 80 mg tablet TAKE 1 TABLET DAILY AT BEDTIME 90 tablet 3    Breo Ellipta 100-25 mcg/dose inhaler Inhale 1 puff once daily.      Cardizem 30 mg immediate release tablet Take 1 tablet (30 mg) by mouth 3 times a day.      choline fenofibrate (Trilipix) 135 mg DR capsule Take 1 capsule (135 mg) by mouth once daily.      diclofenac sodium (Voltaren) 1 % gel gel Apply 4.5 inches (4 g) topically 4 times a day as needed.      DILT- mg 24 hr capsule Take 1 capsule (120 mg) by mouth once daily.      Eliquis 5 mg tablet Take 1 tablet (5  "mg) by mouth 2 times a day. 42 tablet 0    FreeStyle glucose monitoring kit Use daily to check sugar 1 each 0    FreeStyle Test strip Use daily to check sugar 100 each 3    lancets misc Use daily to check sugar 100 each 3    Lasix 40 mg tablet Take 1 tablet (40 mg) by mouth. On Wednesday and Sunday if needed for edema      lisinopril 40 mg tablet TAKE 1 TABLET DAILY 90 tablet 3    metFORMIN (Glucophage) 1,000 mg tablet Take 1 tablet (1,000 mg) by mouth 2 times a day with meals. 180 tablet 3    nystatin (Mycostatin) 100,000 unit/gram powder Apply 1 Application topically if needed. Apply 3-4 times daily prn      tamsulosin (Flomax) 0.4 mg 24 hr capsule TAKE 1 CAPSULE DAILY 90 capsule 3     No current facility-administered medications on file prior to visit.       Blood pressure 118/80, pulse 84, temperature 36.6 °C (97.9 °F), temperature source Temporal, height 1.6 m (5' 3\"), weight 81.6 kg (180 lb), SpO2 94%.  Body mass index is 31.89 kg/m².  General examination: Mild discomfort due to persistent coughing  Eyes: There is no pallor or jaundice  Neck: There is no JVD or carotid bruit  Lungs: Bilateral rhonchi is present  CVS: Heart sounds are regular there is no appreciable murmur  Abdomen: Soft there is no tenderness  CNS: Normal power  Legs: Edema 1+ present bilaterally  Skin: Some bruising present on the extremities.    Assessment and plan:    1. Acute cough/bronchitis  Patient declined COVID-19 testing my office today  Stat chest x-ray does not show any pneumonia however it shows cardiomegaly.  I will prescribe a course of azithromycin changes also advised to follow-up with her regular primary care physician in 2 weeks    2. Shortness of breath  Lung findings are concerning for possible pneumonia and COPD exacerbation.  Stat chest x-ray is being ordered for further evaluation.  I will prescribe Medrol Dosepak for possible COPD exacerbation    3. Hypertension, unspecified type  Controlled on the current " medications.    4. Atrial fibrillation, unspecified type (Multi)  Patient is anticoagulated on Eliquis    5. Pulmonary emphysema, unspecified emphysema type (Multi)  Patient is on inhalers.  I will consider a course of steroids after the x-ray results are available.  He is also on home oxygen during night

## 2024-10-18 ENCOUNTER — TELEPHONE (OUTPATIENT)
Dept: PRIMARY CARE | Facility: CLINIC | Age: 84
End: 2024-10-18
Payer: MEDICARE

## 2024-10-18 NOTE — TELEPHONE ENCOUNTER
Called pt's daughter, rosanna, and informed of message from cardiology NP and pcp, voiced understanding.

## 2024-10-18 NOTE — TELEPHONE ENCOUNTER
"Pt's daughter called and left message \"My dad was seen the beginning of this week and was prescribed a z-pack.  He saw the cardiologist yesterday and he wants him to have another round of the z-pack.  He only has enough left for tomorrow if that could be ordered.\"  Last OV 10/15/24 (Dr. Ye)  "

## 2024-10-18 NOTE — TELEPHONE ENCOUNTER
"Called office of Dr. Doss and spoke with nurse practitioner, Mary Lou.  States \"I saw him today and he still sounds a little hoarse.  I told him that he should finish the antibiotic and the steroid, he is on day three, and to come back and see us in a couple of weeks to see if we are going to do cardioversion.  I did tell him he should follow up with your office, but I did not tell him he needs more antibiotics.\"    "

## 2024-12-10 ENCOUNTER — TELEPHONE (OUTPATIENT)
Dept: PRIMARY CARE | Facility: CLINIC | Age: 84
End: 2024-12-10
Payer: MEDICARE

## 2024-12-10 NOTE — TELEPHONE ENCOUNTER
Pt called requesting printed orders for lab work, he would like mailed to him.  Printed and mailed as requested.

## 2024-12-17 PROBLEM — J96.20 ACUTE ON CHRONIC RESPIRATORY FAILURE (MULTI): Status: RESOLVED | Noted: 2024-10-15 | Resolved: 2024-12-17

## 2024-12-17 PROBLEM — L03.221 CELLULITIS OF NECK: Status: RESOLVED | Noted: 2024-10-15 | Resolved: 2024-12-17

## 2024-12-17 LAB
NON-UH HIE BUN/CREAT RATIO: 17
NON-UH HIE BUN: 17 MG/DL (ref 9–23)
NON-UH HIE CALCIUM: 10.4 MG/DL (ref 8.7–10.4)
NON-UH HIE CALCULATED LDL CHOLESTEROL: 70 MG/DL (ref 60–130)
NON-UH HIE CALCULATED OSMOLALITY: 294 MOSM/KG (ref 275–295)
NON-UH HIE CHLORIDE: 108 MMOL/L (ref 98–107)
NON-UH HIE CHOLESTEROL: 148 MG/DL (ref 100–200)
NON-UH HIE CO2, VENOUS: 31 MMOL/L (ref 20–31)
NON-UH HIE CREATININE: 1 MG/DL (ref 0.6–1.1)
NON-UH HIE GFR AA: >60
NON-UH HIE GLOMERULAR FILTRATION RATE: >60 ML/MIN/1.73M?
NON-UH HIE GLUCOSE: 162 MG/DL (ref 74–106)
NON-UH HIE HCT: 45.7 % (ref 41–52)
NON-UH HIE HDL CHOLESTEROL: 50 MG/DL (ref 40–60)
NON-UH HIE HGB A1C: 7.7 %
NON-UH HIE HGB: 15.3 G/DL (ref 13.5–17.5)
NON-UH HIE INSTR WBC ND: 7.1
NON-UH HIE K: 4.6 MMOL/L (ref 3.5–5.1)
NON-UH HIE MCH: 28.7 PG (ref 27–34)
NON-UH HIE MCHC: 33.5 G/DL (ref 32–37)
NON-UH HIE MCV: 85.7 FL (ref 80–100)
NON-UH HIE MPV: 9.5 FL (ref 7.4–10.4)
NON-UH HIE NA: 145 MMOL/L (ref 135–145)
NON-UH HIE PLATELET: 170 X10 (ref 150–450)
NON-UH HIE RBC: 5.33 X10 (ref 4.7–6.1)
NON-UH HIE RDW: 14.6 % (ref 11.5–14.5)
NON-UH HIE TOTAL CHOL/HDL CHOL RATIO: 3
NON-UH HIE TRIGLYCERIDES: 140 MG/DL (ref 30–150)
NON-UH HIE VIT D 25: 42 NG/ML
NON-UH HIE VITAMIN B12: 863 PG/ML (ref 211–911)
NON-UH HIE WBC: 7.1 X10 (ref 4.5–11)

## 2024-12-18 ENCOUNTER — APPOINTMENT (OUTPATIENT)
Dept: PRIMARY CARE | Facility: CLINIC | Age: 84
End: 2024-12-18
Payer: COMMERCIAL

## 2024-12-18 VITALS
DIASTOLIC BLOOD PRESSURE: 82 MMHG | WEIGHT: 185.8 LBS | HEIGHT: 63 IN | SYSTOLIC BLOOD PRESSURE: 130 MMHG | TEMPERATURE: 98.4 F | BODY MASS INDEX: 32.92 KG/M2 | HEART RATE: 99 BPM | OXYGEN SATURATION: 97 %

## 2024-12-18 DIAGNOSIS — J44.9 CHRONIC OBSTRUCTIVE PULMONARY DISEASE, UNSPECIFIED COPD TYPE (MULTI): ICD-10-CM

## 2024-12-18 DIAGNOSIS — E11.9 TYPE 2 DIABETES MELLITUS WITHOUT COMPLICATION, UNSPECIFIED WHETHER LONG TERM INSULIN USE (MULTI): ICD-10-CM

## 2024-12-18 DIAGNOSIS — R19.7 DIARRHEA, UNSPECIFIED TYPE: ICD-10-CM

## 2024-12-18 DIAGNOSIS — I10 HYPERTENSION, UNSPECIFIED TYPE: ICD-10-CM

## 2024-12-18 DIAGNOSIS — B36.9 FUNGAL SKIN INFECTION: Primary | ICD-10-CM

## 2024-12-18 PROCEDURE — 3075F SYST BP GE 130 - 139MM HG: CPT | Performed by: INTERNAL MEDICINE

## 2024-12-18 PROCEDURE — 1036F TOBACCO NON-USER: CPT | Performed by: INTERNAL MEDICINE

## 2024-12-18 PROCEDURE — 1159F MED LIST DOCD IN RCRD: CPT | Performed by: INTERNAL MEDICINE

## 2024-12-18 PROCEDURE — 1158F ADVNC CARE PLAN TLK DOCD: CPT | Performed by: INTERNAL MEDICINE

## 2024-12-18 PROCEDURE — 1125F AMNT PAIN NOTED PAIN PRSNT: CPT | Performed by: INTERNAL MEDICINE

## 2024-12-18 PROCEDURE — 99214 OFFICE O/P EST MOD 30 MIN: CPT | Performed by: INTERNAL MEDICINE

## 2024-12-18 PROCEDURE — 1123F ACP DISCUSS/DSCN MKR DOCD: CPT | Performed by: INTERNAL MEDICINE

## 2024-12-18 PROCEDURE — 3079F DIAST BP 80-89 MM HG: CPT | Performed by: INTERNAL MEDICINE

## 2024-12-18 RX ORDER — PREDNISONE 10 MG/1
10 TABLET ORAL DAILY
Qty: 5 TABLET | Refills: 0 | Status: SHIPPED | OUTPATIENT
Start: 2024-12-18 | End: 2024-12-23

## 2024-12-18 RX ORDER — NYSTATIN 100000 [USP'U]/G
POWDER TOPICAL
Qty: 60 G | Refills: 2 | Status: SHIPPED | OUTPATIENT
Start: 2024-12-18

## 2024-12-18 RX ORDER — FENOFIBRATE 134 MG/1
1 CAPSULE ORAL DAILY
COMMUNITY

## 2024-12-18 RX ORDER — METFORMIN HYDROCHLORIDE 1000 MG/1
TABLET ORAL
Start: 2024-12-18

## 2024-12-18 RX ORDER — METFORMIN HYDROCHLORIDE 1000 MG/1
TABLET ORAL
Qty: 180 TABLET | Refills: 3 | Status: SHIPPED | OUTPATIENT
Start: 2024-12-18 | End: 2024-12-18

## 2024-12-18 RX ORDER — GLIMEPIRIDE 1 MG/1
TABLET ORAL
Qty: 45 TABLET | Refills: 1 | Status: SHIPPED | OUTPATIENT
Start: 2024-12-18

## 2024-12-18 ASSESSMENT — PAIN SCALES - GENERAL: PAINLEVEL_OUTOF10: 6

## 2024-12-18 ASSESSMENT — PATIENT HEALTH QUESTIONNAIRE - PHQ9
SUM OF ALL RESPONSES TO PHQ9 QUESTIONS 1 & 2: 0
2. FEELING DOWN, DEPRESSED OR HOPELESS: NOT AT ALL
1. LITTLE INTEREST OR PLEASURE IN DOING THINGS: NOT AT ALL

## 2024-12-18 NOTE — PROGRESS NOTES
"Chief Complaint   Patient presents with    Follow-up     Pt here for 4 mo FUV       84 y.o.  man 4 month follow up.   Eating crackers and some bread. States eats a lot of crackers  Typically does not have breakfast.  Not using breo everyday. Does use albuterol and nebulizer  Tried to obtain labs at [place] but got lost so went to .   Diarrhea-mix liquid and soft stools. No blood. 2-3 x a day few months. Associates it with metformin. No travel. Does not think anything he ate triggered it.   Some increase in sob.  No cough. No fever    Last visit 04/2024   Past Medical History  DM II  CTS  Chronic knee pain.   OA knee and hip; right hip replacement 12/2021 Dr Mena  GISELA CPAP -not using  COPD Dr Castro  possible TIA, CVA 2016  word finding difficulty: MRI May 2023 encephalomalacia.  Carotid ultrasound less than 50% bilateral, echo EF 55 to 60% impaired relaxation  Fatty liver  HTN  Cervical radiculopathy  cataract  Hyperlipidemia  Renal calculi  Vitamin D and B12 deficiency  Chronic left lower extremity edema. Cellulitis December 2022 ER      Blood pressure 130/82, pulse 99, temperature 36.9 °C (98.4 °F), height 1.6 m (5' 3\"), weight 84.3 kg (185 lb 12.8 oz), SpO2 97%.  Body mass index is 32.91 kg/m².  Cane. Uncomfortable with changing position.   Physical Examination  General: NAD. Alert.   Neck:  Supple. No thyroid goiter.  Lymph nodes: No cervical or clavicular adenopathy  Cardiovascular: Regular rate rhythm S1-S2 normal no murmur. No carotid bruit.   Lungs: +course breath sounds. No crackles.  No use of accessory muscles  Abdomen:  Normoactive bowel sounds,  non-tender. +large hernia. Limited exam.   Extremities: +1 pretibial edema left > right  Neuro: no facial asymmetry. Speech intact    Labs 12/2024 D, b12, lipid, bmp, A1c, cbc  D 42 B12 863 Cr 1.0 glucose elevated 162 A1c 7.7  148/50/70/140  Wbc 7.1 hg 15.3 platelet 170    Lab Results   Component Value Date    HGBA1C 6.6 (A) 08/21/2024     Labs 4/2024  " lipid, MA, cmp,   A1c, cbc  LDL 88 Cr 0.9 hg 14.5 MA ratio 42    12/2023 A1c 7.4     ASSESSMENT/PLAN  Living Will: has a living will    1. Fungal skin infection (Primary)  - nystatin (Mycostatin) 100,000 unit/gram powder; Apply 3-4 times daily prn rash  Dispense: 60 g; Refill: 2    2. Type 2 diabetes mellitus without complication, unspecified whether long term insulin use (Multi)  Significant increase in A1c  Decrease metformin from 2 bid to 1 po bid because of diarrhea.   Add amaryl  Medication use discussed with patient including potential benefits and side effects. Patient verbalized understanding and agreed to proceed.   - metFORMIN (Glucophage) 1,000 mg tablet; 1 po bid  - glimepiride (AmaryL) 1 mg tablet; One half po before lunch  Dispense: 45 tablet; Refill: 1  - Albumin-Creatinine Ratio, Urine Random; Future  - Hemoglobin A1C; Future  - CBC; Future  - Comprehensive Metabolic Panel; Future    3. Diarrhea, unspecified type  Change dose of metformin    4. Hypertension, unspecified type  Within goal.  - CBC; Future  - Comprehensive Metabolic Panel; Future    5. Chronic obstructive pulmonary disease, unspecified COPD type (Multi)  Advised to use inhaler consistently. Discussed breo is to try and control his disease and prevent sob.  He verbalized understanding  Low dose prednisone for a few days  - predniSONE (Deltasone) 10 mg tablet; Take 1 tablet (10 mg) by mouth once daily for 5 days.  Dispense: 5 tablet; Refill: 0    F/up 4months and prn    Current Outpatient Medications on File Prior to Visit   Medication Sig Dispense Refill    albuterol 2.5 mg /3 mL (0.083 %) nebulizer solution Take 3 mL (2.5 mg) by nebulization every 4 hours if needed.      albuterol 90 mcg/actuation inhaler Inhale 2 puffs every 4 hours if needed.      atorvastatin (Lipitor) 80 mg tablet TAKE 1 TABLET DAILY AT BEDTIME 90 tablet 3    Breo Ellipta 100-25 mcg/dose inhaler Inhale 1 puff once daily.      Cardizem 30 mg immediate release tablet  Take 1 tablet (30 mg) by mouth 3 times a day.      choline fenofibrate (Trilipix) 135 mg DR capsule Take 1 capsule (135 mg) by mouth once daily.      diclofenac sodium (Voltaren) 1 % gel gel Apply 4.5 inches (4 g) topically 4 times a day as needed.      DILT- mg 24 hr capsule Take 1 capsule (120 mg) by mouth once daily.      Eliquis 5 mg tablet Take 1 tablet (5 mg) by mouth 2 times a day. 42 tablet 0    fenofibrate micronized (Lofibra) 134 mg capsule Take 1 capsule (134 mg) by mouth once daily.      FreeStyle Test strip Use daily to check sugar 100 each 3    lancets misc Use daily to check sugar 100 each 3    Lasix 40 mg tablet Take 1 tablet (40 mg) by mouth. On Wednesday and Sunday if needed for edema      lisinopril 40 mg tablet TAKE 1 TABLET DAILY 90 tablet 3    metFORMIN (Glucophage) 1,000 mg tablet Take 1 tablet (1,000 mg) by mouth 2 times a day with meals. 180 tablet 3    nystatin (Mycostatin) 100,000 unit/gram powder Apply 1 Application topically if needed. Apply 3-4 times daily prn      tamsulosin (Flomax) 0.4 mg 24 hr capsule TAKE 1 CAPSULE DAILY 90 capsule 3     No current facility-administered medications on file prior to visit.

## 2025-02-11 DIAGNOSIS — J43.8 OTHER EMPHYSEMA (MULTI): ICD-10-CM

## 2025-02-11 RX ORDER — ALBUTEROL SULFATE 0.83 MG/ML
2.5 SOLUTION RESPIRATORY (INHALATION) EVERY 4 HOURS PRN
Qty: 75 ML | Refills: 3 | Status: SHIPPED | OUTPATIENT
Start: 2025-02-11

## 2025-03-08 LAB
NON-UH HIE A/G RATIO: 1.1
NON-UH HIE ALB: 3.9 G/DL (ref 3.4–5)
NON-UH HIE ALK PHOS: 70 UNIT/L (ref 45–117)
NON-UH HIE BILIRUBIN, TOTAL: 0.8 MG/DL (ref 0.3–1.2)
NON-UH HIE BUN/CREAT RATIO: 16.4
NON-UH HIE BUN: 18 MG/DL (ref 9–23)
NON-UH HIE CALCIUM: 9.9 MG/DL (ref 8.7–10.4)
NON-UH HIE CALCULATED OSMOLALITY: 292 MOSM/KG (ref 275–295)
NON-UH HIE CHLORIDE: 107 MMOL/L (ref 98–107)
NON-UH HIE CO2, VENOUS: 25 MMOL/L (ref 20–31)
NON-UH HIE CREATININE, URINE MG/DL: 216.4 MG/DL
NON-UH HIE CREATININE: 1.1 MG/DL (ref 0.6–1.1)
NON-UH HIE GFR AA: >60
NON-UH HIE GLOBULIN: 3.4 G/DL
NON-UH HIE GLOMERULAR FILTRATION RATE: >60 ML/MIN/1.73M?
NON-UH HIE GLUCOSE: 153 MG/DL (ref 74–106)
NON-UH HIE GOT: 27 UNIT/L (ref 15–37)
NON-UH HIE GPT: 39 UNIT/L (ref 10–49)
NON-UH HIE HCT: 46 % (ref 41–52)
NON-UH HIE HGB A1C: 7 %
NON-UH HIE HGB: 15.5 G/DL (ref 13.5–17.5)
NON-UH HIE INSTR WBC ND: 8
NON-UH HIE K: 4 MMOL/L (ref 3.5–5.1)
NON-UH HIE MCH: 29 PG (ref 27–34)
NON-UH HIE MCHC: 33.8 G/DL (ref 32–37)
NON-UH HIE MCV: 85.7 FL (ref 80–100)
NON-UH HIE MICROALBUMIN, URINE MG/L: 51 MG/L
NON-UH HIE MICROALBUMIN/CREATININE RATIO: 24 MG MALB/GM CREAT (ref 0–30)
NON-UH HIE MPV: 9.9 FL (ref 7.4–10.4)
NON-UH HIE NA: 144 MMOL/L (ref 135–145)
NON-UH HIE PLATELET: 167 X10 (ref 150–450)
NON-UH HIE RBC: 5.36 X10 (ref 4.7–6.1)
NON-UH HIE RDW: 13.8 % (ref 11.5–14.5)
NON-UH HIE TOTAL PROTEIN: 7.3 G/DL (ref 5.7–8.2)
NON-UH HIE WBC: 8 X10 (ref 4.5–11)

## 2025-03-11 ENCOUNTER — APPOINTMENT (OUTPATIENT)
Dept: PRIMARY CARE | Facility: CLINIC | Age: 85
End: 2025-03-11
Payer: COMMERCIAL

## 2025-03-11 ENCOUNTER — TELEPHONE (OUTPATIENT)
Dept: PRIMARY CARE | Facility: CLINIC | Age: 85
End: 2025-03-11

## 2025-03-11 VITALS
OXYGEN SATURATION: 95 % | DIASTOLIC BLOOD PRESSURE: 68 MMHG | SYSTOLIC BLOOD PRESSURE: 140 MMHG | HEIGHT: 63 IN | BODY MASS INDEX: 34.05 KG/M2 | TEMPERATURE: 98.1 F | WEIGHT: 192.2 LBS | HEART RATE: 92 BPM

## 2025-03-11 DIAGNOSIS — E78.5 HYPERLIPIDEMIA, UNSPECIFIED HYPERLIPIDEMIA TYPE: ICD-10-CM

## 2025-03-11 DIAGNOSIS — J44.9 CHRONIC OBSTRUCTIVE PULMONARY DISEASE, UNSPECIFIED COPD TYPE (MULTI): ICD-10-CM

## 2025-03-11 DIAGNOSIS — N40.0 BENIGN PROSTATIC HYPERPLASIA, UNSPECIFIED WHETHER LOWER URINARY TRACT SYMPTOMS PRESENT: ICD-10-CM

## 2025-03-11 DIAGNOSIS — E11.9 TYPE 2 DIABETES MELLITUS WITHOUT COMPLICATION, UNSPECIFIED WHETHER LONG TERM INSULIN USE (MULTI): ICD-10-CM

## 2025-03-11 DIAGNOSIS — E11.69 TYPE 2 DIABETES MELLITUS WITH OTHER SPECIFIED COMPLICATION, WITHOUT LONG-TERM CURRENT USE OF INSULIN: Primary | ICD-10-CM

## 2025-03-11 DIAGNOSIS — J44.1 COPD WITH ACUTE EXACERBATION (MULTI): ICD-10-CM

## 2025-03-11 PROBLEM — I48.91 ATRIAL FIBRILLATION (MULTI): Status: RESOLVED | Noted: 2023-12-11 | Resolved: 2025-03-11

## 2025-03-11 PROCEDURE — 3077F SYST BP >= 140 MM HG: CPT | Performed by: INTERNAL MEDICINE

## 2025-03-11 PROCEDURE — 1126F AMNT PAIN NOTED NONE PRSNT: CPT | Performed by: INTERNAL MEDICINE

## 2025-03-11 PROCEDURE — 3078F DIAST BP <80 MM HG: CPT | Performed by: INTERNAL MEDICINE

## 2025-03-11 PROCEDURE — 1123F ACP DISCUSS/DSCN MKR DOCD: CPT | Performed by: INTERNAL MEDICINE

## 2025-03-11 PROCEDURE — 1036F TOBACCO NON-USER: CPT | Performed by: INTERNAL MEDICINE

## 2025-03-11 PROCEDURE — 1159F MED LIST DOCD IN RCRD: CPT | Performed by: INTERNAL MEDICINE

## 2025-03-11 PROCEDURE — 99214 OFFICE O/P EST MOD 30 MIN: CPT | Performed by: INTERNAL MEDICINE

## 2025-03-11 RX ORDER — AZITHROMYCIN 500 MG/1
500 TABLET, FILM COATED ORAL DAILY
Qty: 10 TABLET | Refills: 0 | Status: SHIPPED | OUTPATIENT
Start: 2025-03-11 | End: 2025-03-11

## 2025-03-11 RX ORDER — AZITHROMYCIN 500 MG/1
500 TABLET, FILM COATED ORAL DAILY
Qty: 10 TABLET | Refills: 0 | Status: SHIPPED | OUTPATIENT
Start: 2025-03-11

## 2025-03-11 RX ORDER — PREDNISONE 10 MG/1
TABLET ORAL
Qty: 30 TABLET | Refills: 0 | Status: SHIPPED | OUTPATIENT
Start: 2025-03-11 | End: 2025-03-21

## 2025-03-11 RX ORDER — GLIMEPIRIDE 1 MG/1
TABLET ORAL
Qty: 45 TABLET | Refills: 3 | Status: SHIPPED | OUTPATIENT
Start: 2025-03-11

## 2025-03-11 RX ORDER — TAMSULOSIN HYDROCHLORIDE 0.4 MG/1
CAPSULE ORAL
Qty: 90 CAPSULE | Refills: 3 | Status: SHIPPED | OUTPATIENT
Start: 2025-03-11

## 2025-03-11 RX ORDER — PREDNISONE 10 MG/1
TABLET ORAL
Qty: 30 TABLET | Refills: 0 | Status: SHIPPED | OUTPATIENT
Start: 2025-03-11 | End: 2025-03-11

## 2025-03-11 ASSESSMENT — PAIN SCALES - GENERAL: PAINLEVEL_OUTOF10: 0-NO PAIN

## 2025-03-11 NOTE — PROGRESS NOTES
"Chief Complaint   Patient presents with    Follow-up     Pt here for 4 mo FUV and c/o cough with shortness of breath, onset 1 week    Cough    Shortness of Breath     P       85 y.o.  man 4 month follow up. Daughter present.   Last visit 12/2024.   Diarrhea resolved.   Cough with beige phlegm. Few days. No known sick contacts. +SOB. Using breo every day. Using nebulizer 1-3 x a day. Not sure if helps. Denies new onset of diffuse myalgias. Has chronic pain but unchanged.   Reports afib-sees Dr Doss      Past Medical History  Afib-Dr Doss, ángela  DM II  CTS  Chronic knee pain.   OA knee and hip; right hip replacement 12/2021 Dr Mena  GISELA CPAP -not using  COPD Dr Castro  possible TIA, CVA 2016  word finding difficulty: MRI May 2023 encephalomalacia.  Carotid ultrasound less than 50% bilateral, echo EF 55 to 60% impaired relaxation  Fatty liver  HTN  Cervical radiculopathy  cataract  Hyperlipidemia  Renal calculi  Vitamin D and B12 deficiency  Chronic left lower extremity edema. Cellulitis December 2022 ER      Blood pressure 140/68, pulse 92, temperature 36.7 °C (98.1 °F), height 1.6 m (5' 3\"), weight 87.2 kg (192 lb 3.2 oz), SpO2 95%.  Body mass index is 34.05 kg/m².  Cane.  General: NAD. Alert.   Neck:  Supple. No thyroid goiter.  Lymph nodes: No cervical or clavicular adenopathy  Cardiovascular: Regular rate rhythm S1-S2 normal no murmur. No carotid bruit.   Lungs: +diffuse wheezing. Intermittent coughing.   Abdomen:  +large hernia. Limited exam.   Extremities: +2 pretibial edema left > right  Neuro: no facial asymmetry. Speech intact    Labs 03/2025 CMP, MA, A1c, cbc  K 4 Cr 1.1 glucose elevated 153 A1c 7 (prior 7.7)  MA ratio within goal at 24  Wbc 8  hg 15.5 platelet 167    Labs 12/2024 D, b12, lipid, bmp, A1c, cbc  D 42 B12 863 Cr 1.0 glucose elevated 162 A1c 7.7  148/50/70/140  Wbc 7.1 hg 15.3 platelet 170    Lab Results   Component Value Date    HGBA1C 6.6 (A) 08/21/2024     Labs 4/2024  lipid, " MA, cmp,   A1c, cbc  LDL 88 Cr 0.9 hg 14.5 MA ratio 42    12/2023 A1c 7.4     ASSESSMENT/PLAN  1. Type 2 diabetes mellitus with other specified complication, without long-term current use of insulin (Primary)  - Hemoglobin A1C; Future  - CBC; Future  Last A1c and MA within goal.     2. Hyperlipidemia, unspecified hyperlipidemia type  Future labs.   - Lipid Panel; Future  - Comprehensive Metabolic Panel; Future    3. Chronic obstructive pulmonary disease, unspecified COPD type (Multi)  Using breo qd    4. Benign prostatic hyperplasia, unspecified whether lower urinary tract symptoms present  - tamsulosin (Flomax) 0.4 mg 24 hr capsule; TAKE 1 CAPSULE DAILY  Dispense: 90 capsule; Refill: 3    5. Type 2 diabetes mellitus without complication, unspecified whether long term insulin use (Multi)  - glimepiride (AmaryL) 1 mg tablet; One half po before lunch  Dispense: 45 tablet; Refill: 3    6. COPD with acute exacerbation (Multi)  Flair of copd. Limited lung exam because of diffuse wheezing. He is able to speak in full sentences  Should be seen if symptoms do not improve or if any worsening.   - XR chest 2 views; Future  - azithromycin (Zithromax) 500 mg tablet; Take 1 tablet (500 mg) by mouth once daily.  Dispense: 10 tablet; Refill: 0  - predniSONE (Deltasone) 10 mg tablet; Take 5 tablets (50 mg) by mouth once daily for 2 days, THEN 4 tablets (40 mg) once daily for 2 days, THEN 3 tablets (30 mg) once daily for 2 days, THEN 2 tablets (20 mg) once daily for 2 days, THEN 1 tablet (10 mg) once daily for 2 days.  Dispense: 30 tablet; Refill: 0    Living Will: has a living will    F/up 3months and prn    Current Outpatient Medications on File Prior to Visit   Medication Sig Dispense Refill    albuterol 2.5 mg /3 mL (0.083 %) nebulizer solution Take 3 mL (2.5 mg) by nebulization every 4 hours if needed for shortness of breath. 75 mL 3    albuterol 90 mcg/actuation inhaler Inhale 2 puffs every 4 hours if needed.      atorvastatin  (Lipitor) 80 mg tablet TAKE 1 TABLET DAILY AT BEDTIME 90 tablet 3    Breo Ellipta 100-25 mcg/dose inhaler Inhale 1 puff once daily.      Cardizem 30 mg immediate release tablet Take 1 tablet (30 mg) by mouth 3 times a day.      choline fenofibrate (Trilipix) 135 mg DR capsule Take 1 capsule (135 mg) by mouth once daily.      diclofenac sodium (Voltaren) 1 % gel gel Apply 4.5 inches (4 g) topically 4 times a day as needed.      DILT- mg 24 hr capsule Take 1 capsule (120 mg) by mouth once daily.      Eliquis 5 mg tablet Take 1 tablet (5 mg) by mouth 2 times a day. 42 tablet 0    FreeStyle Test strip Use daily to check sugar 100 each 3    glimepiride (AmaryL) 1 mg tablet One half po before lunch 45 tablet 1    lancets misc Use daily to check sugar 100 each 3    Lasix 40 mg tablet Take 1 tablet (40 mg) by mouth. On Wednesday and Sunday if needed for edema      lisinopril 40 mg tablet TAKE 1 TABLET DAILY 90 tablet 3    metFORMIN (Glucophage) 1,000 mg tablet 1 po bid      nystatin (Mycostatin) 100,000 unit/gram powder Apply 3-4 times daily prn rash 60 g 2    tamsulosin (Flomax) 0.4 mg 24 hr capsule TAKE 1 CAPSULE DAILY 90 capsule 3    fenofibrate micronized (Lofibra) 134 mg capsule Take 1 capsule (134 mg) by mouth once daily.       No current facility-administered medications on file prior to visit.

## 2025-03-11 NOTE — TELEPHONE ENCOUNTER
----- Message from Ramona Beth sent at 3/11/2025 12:39 PM EDT -----  Chest x-ray shows changes that can be seen with bronchitis.  There is underlying emphysematous changes as expected.  No evidence of pneumonia

## 2025-06-05 ENCOUNTER — OFFICE VISIT (OUTPATIENT)
Dept: PRIMARY CARE | Facility: CLINIC | Age: 85
End: 2025-06-05
Payer: COMMERCIAL

## 2025-06-05 VITALS
TEMPERATURE: 97.9 F | HEART RATE: 90 BPM | OXYGEN SATURATION: 96 % | WEIGHT: 191.8 LBS | DIASTOLIC BLOOD PRESSURE: 69 MMHG | SYSTOLIC BLOOD PRESSURE: 139 MMHG | BODY MASS INDEX: 33.98 KG/M2 | HEIGHT: 63 IN
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
## 2025-06-05 DIAGNOSIS — T14.8XXA HEMATOMA: Primary | ICD-10-CM

## 2025-06-05 PROCEDURE — 3078F DIAST BP <80 MM HG: CPT | Performed by: INTERNAL MEDICINE

## 2025-06-05 PROCEDURE — 1158F ADVNC CARE PLAN TLK DOCD: CPT | Performed by: INTERNAL MEDICINE

## 2025-06-05 PROCEDURE — 3075F SYST BP GE 130 - 139MM HG: CPT | Performed by: INTERNAL MEDICINE

## 2025-06-05 PROCEDURE — 1159F MED LIST DOCD IN RCRD: CPT | Performed by: INTERNAL MEDICINE

## 2025-06-05 PROCEDURE — 99213 OFFICE O/P EST LOW 20 MIN: CPT | Performed by: INTERNAL MEDICINE

## 2025-06-05 PROCEDURE — 1036F TOBACCO NON-USER: CPT | Performed by: INTERNAL MEDICINE

## 2025-06-05 ASSESSMENT — PATIENT HEALTH QUESTIONNAIRE - PHQ9
SUM OF ALL RESPONSES TO PHQ9 QUESTIONS 1 AND 2: 0
1. LITTLE INTEREST OR PLEASURE IN DOING THINGS: NOT AT ALL
2. FEELING DOWN, DEPRESSED OR HOPELESS: NOT AT ALL

## 2025-06-05 NOTE — PROGRESS NOTES
"Chief Complaint   Patient presents with    Follow-up     HAS A HEMATOMA ON LEFT HIP AND BUTTOCKS THAT MIGHT NEED DRAINED       85 y.o.  man .   Last visit 03/2025  Patient accompanied by family.  He reports he fell a week ago.  His neighbor helped him up.  He had some bruising initially was mild left thigh buttock.  Family has a picture of how it was the next day.  However each day the bruising and swelling has progressively worsened.  Continues to worsen and he states that it is progressed since yesterday.  He is on Eliquis and he has been taking it.  He denies any dizziness or lightheadedness.      Past Medical History  Afib-Dr Doss, eliquis  DM II  CTS  Chronic knee pain.   OA knee and hip; right hip replacement 12/2021 Dr Mena  GISELA CPAP -not using  COPD Dr Castro  possible TIA, CVA 2016  word finding difficulty: MRI May 2023 encephalomalacia.  Carotid ultrasound less than 50% bilateral, echo EF 55 to 60% impaired relaxation  Fatty liver  HTN  Cervical radiculopathy  cataract  Hyperlipidemia  Renal calculi  Vitamin D and B12 deficiency  Chronic left lower extremity edema. Cellulitis December 2022 ER      Blood pressure 139/69, pulse 90, temperature 36.6 °C (97.9 °F), height 1.6 m (5' 3\"), weight 87 kg (191 lb 12.8 oz), SpO2 96%.  Body mass index is 33.98 kg/m².  Cane.  General: NAD. Alert.  He appears uncomfortable changing position but this is chronic  Family left the room for the exam.  The left thigh and buttock have extensive bruising.  It is the entire thigh.  The buttock has an large area of swelling that is firm.      Labs 03/2025 CMP, MA, A1c, cbc  K 4 Cr 1.1 glucose elevated 153 A1c 7 (prior 7.7)  MA ratio within goal at 24  Wbc 8  hg 15.5 platelet 167    Labs 12/2024 D, b12, lipid, bmp, A1c, cbc  D 42 B12 863 Cr 1.0 glucose elevated 162 A1c 7.7  148/50/70/140  Wbc 7.1 hg 15.3 platelet 170    Lab Results   Component Value Date    HGBA1C 6.6 (A) 08/21/2024     Labs 4/2024  lipid, MA, cmp,   A1c, " cbc  LDL 88 Cr 0.9 hg 14.5 MA ratio 42    12/2023 A1c 7.4     ASSESSMENT/PLAN  1. Hematoma (Primary)  2. Fall, initial encounter  Advised to not take any Eliquis until evaluated.  Given the extent of swelling and bruising advised ER evaluation.  His exam is consistent with a hematoma.  The concern is that he continues to have worsening swelling so he may be actively bleeding.  His blood pressure and heart rate are stable.  Discussed going to the ER so he can have stat lab work and probably will need imaging and evaluation if he needs drainage or not.  They are amenable to this.  They will go to [facility].  Called [facility] and spoke with the charge nurse and report given    Living Will: has a living will    F/up 3months and prn    Current Outpatient Medications on File Prior to Visit   Medication Sig Dispense Refill    albuterol 2.5 mg /3 mL (0.083 %) nebulizer solution Take 3 mL (2.5 mg) by nebulization every 4 hours if needed for shortness of breath. 75 mL 3    albuterol 90 mcg/actuation inhaler Inhale 2 puffs every 4 hours if needed.      atorvastatin (Lipitor) 80 mg tablet TAKE 1 TABLET DAILY AT BEDTIME 90 tablet 3    azithromycin (Zithromax) 500 mg tablet Take 1 tablet (500 mg) by mouth once daily. (Patient not taking: Reported on 6/5/2025) 10 tablet 0    Breo Ellipta 100-25 mcg/dose inhaler Inhale 1 puff once daily.      Cardizem 30 mg immediate release tablet Take 1 tablet (30 mg) by mouth 3 times a day.      choline fenofibrate (Trilipix) 135 mg DR capsule Take 1 capsule (135 mg) by mouth once daily.      diclofenac sodium (Voltaren) 1 % gel gel Apply 4.5 inches (4 g) topically 4 times a day as needed.      DILT- mg 24 hr capsule Take 1 capsule (120 mg) by mouth once daily.      Eliquis 5 mg tablet Take 1 tablet (5 mg) by mouth 2 times a day. 42 tablet 0    fenofibrate micronized (Lofibra) 134 mg capsule Take 1 capsule (134 mg) by mouth once daily.      FreeStyle Test strip Use daily to check sugar 100  each 3    glimepiride (AmaryL) 1 mg tablet One half po before lunch 45 tablet 3    lancets misc Use daily to check sugar 100 each 3    Lasix 40 mg tablet Take 1 tablet (40 mg) by mouth. On Wednesday and Sunday if needed for edema      lisinopril 40 mg tablet TAKE 1 TABLET DAILY 90 tablet 3    metFORMIN (Glucophage) 1,000 mg tablet 1 po bid      nystatin (Mycostatin) 100,000 unit/gram powder Apply 3-4 times daily prn rash 60 g 2    tamsulosin (Flomax) 0.4 mg 24 hr capsule TAKE 1 CAPSULE DAILY 90 capsule 3     No current facility-administered medications on file prior to visit.

## 2025-06-06 ENCOUNTER — TELEPHONE (OUTPATIENT)
Dept: PRIMARY CARE | Facility: CLINIC | Age: 85
End: 2025-06-06
Payer: MEDICARE

## 2025-06-11 DIAGNOSIS — E78.5 HYPERLIPIDEMIA, UNSPECIFIED HYPERLIPIDEMIA TYPE: ICD-10-CM

## 2025-06-11 DIAGNOSIS — E11.69 TYPE 2 DIABETES MELLITUS WITH OTHER SPECIFIED COMPLICATION, WITHOUT LONG-TERM CURRENT USE OF INSULIN: ICD-10-CM

## 2025-06-17 ENCOUNTER — PATIENT OUTREACH (OUTPATIENT)
Dept: PRIMARY CARE | Facility: CLINIC | Age: 85
End: 2025-06-17
Payer: MEDICARE

## 2025-06-17 ENCOUNTER — TELEPHONE (OUTPATIENT)
Dept: PRIMARY CARE | Facility: CLINIC | Age: 85
End: 2025-06-17
Payer: MEDICARE

## 2025-06-17 NOTE — TELEPHONE ENCOUNTER
Called [facility] confidential line.  Left detailed message as requested.  Encouraged to contact clinical with questions

## 2025-06-17 NOTE — PROGRESS NOTES
Discharge Facility:  [facility]   Discharge Diagnosis:  Hypomagnesemia  Left leg cellulitis  Chronic obstructive pulmonary disease, unspecified COPD type (HCC)  Left leg pain  Bruising  Admission Date:  6/14/25  Discharge Date:   6/16/25    PCP Appointment Date:  Appointment with Ramona Beth MD (07/15/2025)   Specialist Appointment Date:     Hospital Encounter and Summary Linked: Yes  Jun 14  Hospital Encounter with Justice Crandall MD; Sheeba Vergara DO; Sp Cheung MD     [facility]   Agency [facility]   Phone# [phone]   Start of Care 06/18/25     cephALEXin (KEFLEX) 500 mg capsule  Take 1 capsule by mouth three times a day for 7 days. 21 capsule      Four attempts were made to reach patient within two business days after discharge. I left voicemail to introduce myself and the TCM program to Manny Barajas.

## 2025-06-17 NOTE — TELEPHONE ENCOUNTER
Received message from García with [facility].  Pt is discharging from [facility] with referral for home health care.  They would like to know if pcp will follow for orders?  Call back: [phone]

## 2025-06-19 ENCOUNTER — OFFICE VISIT (OUTPATIENT)
Dept: PRIMARY CARE | Facility: CLINIC | Age: 85
End: 2025-06-19
Payer: MEDICARE

## 2025-06-19 VITALS
HEART RATE: 89 BPM | BODY MASS INDEX: 34.41 KG/M2 | HEIGHT: 63 IN | SYSTOLIC BLOOD PRESSURE: 132 MMHG | DIASTOLIC BLOOD PRESSURE: 72 MMHG | TEMPERATURE: 97.9 F | WEIGHT: 194.2 LBS | OXYGEN SATURATION: 97 %

## 2025-06-19 DIAGNOSIS — L03.90 CELLULITIS, UNSPECIFIED CELLULITIS SITE: Primary | ICD-10-CM

## 2025-06-19 DIAGNOSIS — R60.9 EDEMA, UNSPECIFIED TYPE: ICD-10-CM

## 2025-06-19 DIAGNOSIS — H92.01 RIGHT EAR PAIN: ICD-10-CM

## 2025-06-19 PROCEDURE — 1159F MED LIST DOCD IN RCRD: CPT | Performed by: INTERNAL MEDICINE

## 2025-06-19 PROCEDURE — 1125F AMNT PAIN NOTED PAIN PRSNT: CPT | Performed by: INTERNAL MEDICINE

## 2025-06-19 PROCEDURE — 3078F DIAST BP <80 MM HG: CPT | Performed by: INTERNAL MEDICINE

## 2025-06-19 PROCEDURE — 1036F TOBACCO NON-USER: CPT | Performed by: INTERNAL MEDICINE

## 2025-06-19 PROCEDURE — 99496 TRANSJ CARE MGMT HIGH F2F 7D: CPT | Performed by: INTERNAL MEDICINE

## 2025-06-19 PROCEDURE — 3075F SYST BP GE 130 - 139MM HG: CPT | Performed by: INTERNAL MEDICINE

## 2025-06-19 RX ORDER — ASCORBIC ACID 500 MG
500 TABLET ORAL
COMMUNITY

## 2025-06-19 RX ORDER — DILTIAZEM HYDROCHLORIDE 120 MG/1
120 CAPSULE, EXTENDED RELEASE ORAL DAILY
COMMUNITY
Start: 2025-04-23 | End: 2025-06-19 | Stop reason: WASHOUT

## 2025-06-19 RX ORDER — NEOMYCIN SULFATE, POLYMYXIN B SULFATE, HYDROCORTISONE 3.5; 10000; 1 MG/ML; [USP'U]/ML; MG/ML
2 SOLUTION/ DROPS AURICULAR (OTIC) 4 TIMES DAILY
Qty: 10 ML | Refills: 0 | Status: SHIPPED | OUTPATIENT
Start: 2025-06-19

## 2025-06-19 RX ORDER — CEPHALEXIN 500 MG/1
500 CAPSULE ORAL 3 TIMES DAILY
COMMUNITY
Start: 2025-06-16 | End: 2025-06-23

## 2025-06-19 RX ORDER — OXYCODONE AND ACETAMINOPHEN 5; 325 MG/1; MG/1
1 TABLET ORAL EVERY 6 HOURS PRN
COMMUNITY
Start: 2025-06-06

## 2025-06-19 ASSESSMENT — PATIENT HEALTH QUESTIONNAIRE - PHQ9
2. FEELING DOWN, DEPRESSED OR HOPELESS: NOT AT ALL
1. LITTLE INTEREST OR PLEASURE IN DOING THINGS: NOT AT ALL
SUM OF ALL RESPONSES TO PHQ9 QUESTIONS 1 & 2: 0

## 2025-06-19 ASSESSMENT — LIFESTYLE VARIABLES
HOW MANY STANDARD DRINKS CONTAINING ALCOHOL DO YOU HAVE ON A TYPICAL DAY: PATIENT DOES NOT DRINK
SKIP TO QUESTIONS 9-10: 1
HOW OFTEN DO YOU HAVE SIX OR MORE DRINKS ON ONE OCCASION: NEVER
AUDIT-C TOTAL SCORE: 0
HOW OFTEN DO YOU HAVE A DRINK CONTAINING ALCOHOL: NEVER

## 2025-06-19 ASSESSMENT — PAIN SCALES - GENERAL: PAINLEVEL_OUTOF10: 6

## 2025-06-19 NOTE — PROGRESS NOTES
"Chief Complaint   Patient presents with    Hospital Follow-up     Pt here for hospital follow up/TCM, [facility] admit 6/14/25 - 6/16/25       85 y.o.  man .   Last visit 06/2025  Patient accompanied by his daughter.  He reports he fell a week prior to last visit.     Bruising left thigh buttock that progressively worsened. At last visit he was sent to the ER.   Admitted-low magnesium, left leg cellulitis treated with cefazolin, bruising, and copd. Magnesium corrected.  Left buttock hematoma almost gone. Still has swelling and pain LLE but improving. Taking keflex but patient not sure if he took the afternoon dose. He will take tid until he runs out. No fever. He states they advised him to keep his leg wrap and elevated.     Past Medical History  Afib-Dr Doss, ángela  DM II  CTS  Chronic knee pain.   OA knee and hip; right hip replacement 12/2021 Dr Mena  GISELA CPAP -not using  COPD Dr Castro  possible TIA, CVA 2016  word finding difficulty: MRI May 2023 encephalomalacia.  Carotid ultrasound less than 50% bilateral, echo EF 55 to 60% impaired relaxation  Fatty liver  HTN  Cervical radiculopathy  cataract  Hyperlipidemia  Renal calculi  Vitamin D and B12 deficiency  Chronic left lower extremity edema. Cellulitis December 2022 ER   Cellulitis 06/2025      Blood pressure 132/72, pulse 89, temperature 36.6 °C (97.9 °F), height 1.6 m (5' 3\"), weight 88.1 kg (194 lb 3.2 oz), SpO2 97%.  Body mass index is 34.4 kg/m². Appears uncomfortable with changing positions, this is chronic. At times wincing with right ear pain.   Cane. No maxillary sinus tenderness.   Physical Exam  Constitutional:       Appearance: He is not toxic-appearing.   HENT:      Head: Normocephalic and atraumatic.      Right Ear: Tympanic membrane, ear canal and external ear normal. There is no impacted cerumen.      Left Ear: Tympanic membrane, ear canal and external ear normal. There is no impacted cerumen.   Cardiovascular:      Rate and Rhythm: Normal " rate.      Pulses: Normal pulses.      Heart sounds: Normal heart sounds.   Musculoskeletal:         General: Swelling present.      Comments: Swelling LLE, tender to palpation, and slight darkened color.    Lymphadenopathy:      Cervical: No cervical adenopathy.   Buttock very slight amount of bruising. No hematoma      Labs 03/2025 CMP, MA, A1c, cbc  K 4 Cr 1.1 glucose elevated 153 A1c 7 (prior 7.7)  MA ratio within goal at 24  Wbc 8  hg 15.5 platelet 167    Labs 12/2024 D, b12, lipid, bmp, A1c, cbc  D 42 B12 863 Cr 1.0 glucose elevated 162 A1c 7.7  148/50/70/140  Wbc 7.1 hg 15.3 platelet 170       ASSESSMENT/PLAN  1. Cellulitis, unspecified cellulitis site (Primary)  2. Edema, unspecified type  Complete keflex as prescribed.   Leg elevation. Keep wrapped.     3. Right ear pain  Does not appear infected.   - neomycin-polymyxin-HC (Cortisporin) otic solution; Administer 2 drops into the right ear 4 times a day.  Dispense: 10 mL; Refill: 0    Has close f/up  Labs prior to next visit.     Living Will: has a living will    Current Outpatient Medications on File Prior to Visit   Medication Sig Dispense Refill    albuterol 2.5 mg /3 mL (0.083 %) nebulizer solution Take 3 mL (2.5 mg) by nebulization every 4 hours if needed for shortness of breath. 75 mL 3    albuterol 90 mcg/actuation inhaler Inhale 2 puffs every 4 hours if needed.      ascorbic acid (Vitamin C) 500 mg tablet Take 1 tablet (500 mg) by mouth once daily.      atorvastatin (Lipitor) 80 mg tablet TAKE 1 TABLET DAILY AT BEDTIME 90 tablet 3    Breo Ellipta 100-25 mcg/dose inhaler Inhale 1 puff once daily.      cephalexin (Keflex) 500 mg capsule Take 1 capsule (500 mg) by mouth 3 times a day.      choline fenofibrate (Trilipix) 135 mg DR capsule Take 1 capsule (135 mg) by mouth once daily.      diclofenac sodium (Voltaren) 1 % gel gel Apply 4.5 inches (4 g) topically 4 times a day as needed.      DILT- mg 24 hr capsule Take 1 capsule (120 mg) by mouth  once daily.      Eliquis 5 mg tablet Take 1 tablet (5 mg) by mouth 2 times a day. 42 tablet 0    FreeStyle Test strip Use daily to check sugar 100 each 3    glimepiride (AmaryL) 1 mg tablet One half po before lunch 45 tablet 3    lancets misc Use daily to check sugar 100 each 3    Lasix 40 mg tablet Take 1 tablet (40 mg) by mouth. On Wednesday and Sunday if needed for edema      lisinopril 40 mg tablet TAKE 1 TABLET DAILY 90 tablet 3    metFORMIN (Glucophage) 1,000 mg tablet 1 po bid      nystatin (Mycostatin) 100,000 unit/gram powder Apply 3-4 times daily prn rash 60 g 2    oxyCODONE-acetaminophen (Percocet) 5-325 mg tablet Take 1 tablet by mouth every 6 hours if needed for pain.      tamsulosin (Flomax) 0.4 mg 24 hr capsule TAKE 1 CAPSULE DAILY 90 capsule 3    [DISCONTINUED] Cardizem 30 mg immediate release tablet Take 1 tablet (30 mg) by mouth 3 times a day. (Patient not taking: Reported on 6/19/2025)      [DISCONTINUED] dilTIAZem XR (Dilacor XR) 120 mg 24 hr capsule Take 1 capsule (120 mg) by mouth once daily. (Patient not taking: Reported on 6/19/2025)      [DISCONTINUED] fenofibrate micronized (Lofibra) 134 mg capsule Take 1 capsule (134 mg) by mouth once daily. (Patient not taking: Reported on 6/19/2025)       Current Facility-Administered Medications on File Prior to Visit   Medication Dose Route Frequency Provider Last Rate Last Admin    [DISCONTINUED] GENERIC EXTERNAL MEDICATION     External Data Provider Generic        [DISCONTINUED] GENERIC EXTERNAL MEDICATION     External Data Provider Generic        [DISCONTINUED] GENERIC EXTERNAL MEDICATION     External Data Provider Generic

## 2025-06-23 ENCOUNTER — TELEPHONE (OUTPATIENT)
Dept: PRIMARY CARE | Facility: CLINIC | Age: 85
End: 2025-06-23
Payer: MEDICARE

## 2025-06-23 NOTE — TELEPHONE ENCOUNTER
Received message from Lorna, occupational therapist with [facility].  She is requesting verbal order to see pt once weekly x 4 weeks.    Call back: [phone]

## 2025-06-25 ENCOUNTER — TELEPHONE (OUTPATIENT)
Dept: PRIMARY CARE | Facility: CLINIC | Age: 85
End: 2025-06-25
Payer: MEDICARE

## 2025-06-25 DIAGNOSIS — E78.5 HYPERLIPIDEMIA, UNSPECIFIED HYPERLIPIDEMIA TYPE: ICD-10-CM

## 2025-06-25 RX ORDER — ATORVASTATIN CALCIUM 80 MG/1
80 TABLET, FILM COATED ORAL NIGHTLY
Qty: 90 TABLET | Refills: 3 | Status: SHIPPED | OUTPATIENT
Start: 2025-06-25

## 2025-06-25 NOTE — TELEPHONE ENCOUNTER
Called office of Dr. Doss and spoke with Rebecca.  Informed of provider message to Dr. Doss.  Rebecca will send message to Dr. Doss and call back with response.

## 2025-06-25 NOTE — TELEPHONE ENCOUNTER
----- Message from Ramona Beth sent at 6/24/2025  5:40 PM EDT -----  Please contact DR Doss's office-pt on diltiazem and atorvastatin. Potential for interaction with diltiazem could increase level of atorvastatin that could cause rhabdo. Please check if he wants to continue this combination or if wants to make  a change.

## 2025-06-25 NOTE — TELEPHONE ENCOUNTER
Spoke with DR Doss ok to take both diltiazem and atorvastatin.  On his review it is if the atorvastatin + nitrate or fibrate is used in combination with the diltiazem then risk of rhabdomyolysis but he did not see it if it is just diltiazem a atorvastatin together.  They do use this combination frequently.  Will continue

## 2025-06-27 ENCOUNTER — TELEPHONE (OUTPATIENT)
Dept: PRIMARY CARE | Facility: CLINIC | Age: 85
End: 2025-06-27
Payer: MEDICARE

## 2025-06-27 DIAGNOSIS — E11.9 TYPE 2 DIABETES MELLITUS WITHOUT COMPLICATION, UNSPECIFIED WHETHER LONG TERM INSULIN USE: ICD-10-CM

## 2025-06-27 RX ORDER — GLIMEPIRIDE 1 MG/1
TABLET ORAL
Qty: 90 TABLET | Refills: 3 | Status: SHIPPED | OUTPATIENT
Start: 2025-06-27

## 2025-06-27 NOTE — TELEPHONE ENCOUNTER
Received message from Tete Aiken [facility] nurse, reporting last 4 fasting blood sugars.  6/27/2025 - 161  6/26/25 - 198  6/25/25 - 272  6/24/25 - 232  Call back: [phone]

## 2025-06-27 NOTE — TELEPHONE ENCOUNTER
Attempted to contact home health nurse.  Left message to return call to clinical.  Called pt and informed, voiced understanding.

## 2025-07-01 ENCOUNTER — PATIENT OUTREACH (OUTPATIENT)
Dept: PRIMARY CARE | Facility: CLINIC | Age: 85
End: 2025-07-01
Payer: MEDICARE

## 2025-07-10 ENCOUNTER — TELEPHONE (OUTPATIENT)
Dept: PRIMARY CARE | Facility: CLINIC | Age: 85
End: 2025-07-10
Payer: MEDICARE

## 2025-07-10 NOTE — TELEPHONE ENCOUNTER
"Received message from Nelli, nurse with [facility] \"I want to report that the patient had a fasting blood sugar of 182 this morning.\"  Call back for Nelli: [phone]  "

## 2025-07-15 ENCOUNTER — APPOINTMENT (OUTPATIENT)
Dept: PRIMARY CARE | Facility: CLINIC | Age: 85
End: 2025-07-15
Payer: MEDICARE

## 2025-07-15 VITALS
HEIGHT: 63 IN | HEART RATE: 83 BPM | OXYGEN SATURATION: 98 % | BODY MASS INDEX: 33.91 KG/M2 | TEMPERATURE: 97.7 F | SYSTOLIC BLOOD PRESSURE: 128 MMHG | WEIGHT: 191.4 LBS | DIASTOLIC BLOOD PRESSURE: 78 MMHG

## 2025-07-15 DIAGNOSIS — I48.91 ATRIAL FIBRILLATION, UNSPECIFIED TYPE (MULTI): Primary | ICD-10-CM

## 2025-07-15 DIAGNOSIS — I10 HYPERTENSION, UNSPECIFIED TYPE: ICD-10-CM

## 2025-07-15 DIAGNOSIS — E11.65 TYPE 2 DIABETES MELLITUS WITH HYPERGLYCEMIA, WITHOUT LONG-TERM CURRENT USE OF INSULIN: ICD-10-CM

## 2025-07-15 PROCEDURE — 3078F DIAST BP <80 MM HG: CPT | Performed by: INTERNAL MEDICINE

## 2025-07-15 PROCEDURE — 1036F TOBACCO NON-USER: CPT | Performed by: INTERNAL MEDICINE

## 2025-07-15 PROCEDURE — 99214 OFFICE O/P EST MOD 30 MIN: CPT | Performed by: INTERNAL MEDICINE

## 2025-07-15 PROCEDURE — 1126F AMNT PAIN NOTED NONE PRSNT: CPT | Performed by: INTERNAL MEDICINE

## 2025-07-15 PROCEDURE — 1159F MED LIST DOCD IN RCRD: CPT | Performed by: INTERNAL MEDICINE

## 2025-07-15 PROCEDURE — 3074F SYST BP LT 130 MM HG: CPT | Performed by: INTERNAL MEDICINE

## 2025-07-15 RX ORDER — LISINOPRIL 40 MG/1
40 TABLET ORAL DAILY
Qty: 90 TABLET | Refills: 3 | Status: SHIPPED | OUTPATIENT
Start: 2025-07-15

## 2025-07-15 SDOH — ECONOMIC STABILITY: FOOD INSECURITY: WITHIN THE PAST 12 MONTHS, YOU WORRIED THAT YOUR FOOD WOULD RUN OUT BEFORE YOU GOT MONEY TO BUY MORE.: NEVER TRUE

## 2025-07-15 SDOH — ECONOMIC STABILITY: FOOD INSECURITY: WITHIN THE PAST 12 MONTHS, THE FOOD YOU BOUGHT JUST DIDN'T LAST AND YOU DIDN'T HAVE MONEY TO GET MORE.: NEVER TRUE

## 2025-07-15 ASSESSMENT — COLUMBIA-SUICIDE SEVERITY RATING SCALE - C-SSRS
1. IN THE PAST MONTH, HAVE YOU WISHED YOU WERE DEAD OR WISHED YOU COULD GO TO SLEEP AND NOT WAKE UP?: NO
2. HAVE YOU ACTUALLY HAD ANY THOUGHTS OF KILLING YOURSELF?: NO
6. HAVE YOU EVER DONE ANYTHING, STARTED TO DO ANYTHING, OR PREPARED TO DO ANYTHING TO END YOUR LIFE?: NO

## 2025-07-15 ASSESSMENT — PAIN SCALES - GENERAL: PAINLEVEL_OUTOF10: 0-NO PAIN

## 2025-07-15 NOTE — PROGRESS NOTES
"Chief Complaint   Patient presents with    Follow-up     Pt here for 4 mo FUV       85 y.o.  man .   Last visit 06/2025    Bruising left thigh buttock that progressively worsened. At last visit he was sent to the ER.   Admitted-low magnesium, left leg cellulitis treated with cefazolin, bruising, and copd. Magnesium corrected.  Left buttock hematoma almost gone. Still has swelling and pain LLE but improving. Took keflex    07/2025  Sometimes wheezing. Takes breo daily. Did not use albuterol today.   Left leg swelling at times. Does elevate his legs. Not using compression stockings. He is planning to go to [facility] to find something to help lift the compression stockings.    Has not seen Dr Doss recently. Thinking of switching cardiologists.     Past Medical History  Afib-Dr Doss, ángela  DM II  CTS  Chronic knee pain.   OA knee and hip; right hip replacement 12/2021 Dr Mena  GISELA CPAP -not using  COPD Dr Castro  possible TIA, CVA 2016  word finding difficulty: MRI May 2023 encephalomalacia.  Carotid ultrasound less than 50% bilateral, echo EF 55 to 60% impaired relaxation  Fatty liver  HTN  Cervical radiculopathy  cataract  Hyperlipidemia  Renal calculi  Vitamin D and B12 deficiency  Chronic left lower extremity edema. Cellulitis December 2022 ER   Cellulitis 06/2025      Blood pressure 128/78, pulse 83, temperature 36.5 °C (97.7 °F), height 1.6 m (5' 3\"), weight 86.8 kg (191 lb 6.4 oz), SpO2 98%.  Body mass index is 33.9 kg/m².   Appears uncomfortable when changing position. +cane  Physical Exam  Cardiovascular:      Rate and Rhythm: Normal rate and regular rhythm.      Heart sounds: Normal heart sounds.   Pulmonary:      Breath sounds: Wheezing present. No rales.   Abdominal:      General: There is distension.      Hernia: A hernia is present.   Musculoskeletal:      Right lower leg: No edema.      Left lower leg: Edema present.   Neurological:      Mental Status: He is alert. Mental status is at baseline. "   Trace LLE edema    Labs 03/2025 CMP, MA, A1c, cbc  K 4 Cr 1.1 glucose elevated 153 A1c 7 (prior 7.7)  MA ratio within goal at 24  Wbc 8  hg 15.5 platelet 167    Labs 12/2024 D, b12, lipid, bmp, A1c, cbc  D 42 B12 863 Cr 1.0 glucose elevated 162 A1c 7.7  148/50/70/140  Wbc 7.1 hg 15.3 platelet 170       ASSESSMENT/PLAN  1. Atrial fibrillation, unspecified type (Multi) (Primary)  - Referral to Cardiology; Future  Labs as previously ordered    2. Type 2 diabetes mellitus with hyperglycemia, without long-term current use of insulin  - empagliflozin (Jardiance) 10 mg tablet; Take 1 tablet (10 mg) by mouth once daily.  Dispense: 30 tablet; Refill: 3  Medication use discussed with patient including potential benefits and side effects. Patient verbalized understanding and agreed to proceed.   If problem with coverage contact office and will refer to  pharmacist-d/w pt.    3. Hypertension, unspecified type  Well controlled.   - lisinopril 40 mg tablet; Take 1 tablet (40 mg) by mouth once daily.  Dispense: 90 tablet; Refill: 3    Living Will: has a living will    Current Outpatient Medications on File Prior to Visit   Medication Sig Dispense Refill    albuterol 2.5 mg /3 mL (0.083 %) nebulizer solution Take 3 mL (2.5 mg) by nebulization every 4 hours if needed for shortness of breath. 75 mL 3    albuterol 90 mcg/actuation inhaler Inhale 2 puffs every 4 hours if needed.      ascorbic acid (Vitamin C) 500 mg tablet Take 1 tablet (500 mg) by mouth once daily.      atorvastatin (Lipitor) 80 mg tablet Take 1 tablet (80 mg) by mouth once daily at bedtime. 90 tablet 3    Breo Ellipta 100-25 mcg/dose inhaler Inhale 1 puff once daily.      choline fenofibrate (Trilipix) 135 mg DR capsule Take 1 capsule (135 mg) by mouth once daily.      diclofenac sodium (Voltaren) 1 % gel gel Apply 4.5 inches (4 g) topically 4 times a day as needed.      DILT- mg 24 hr capsule Take 1 capsule (120 mg) by mouth once daily.      Eliquis 5 mg  tablet Take 1 tablet (5 mg) by mouth 2 times a day. 42 tablet 0    FreeStyle Test strip Use daily to check sugar 100 each 3    glimepiride (AmaryL) 1 mg tablet One half po before breakfast and one half before dinner 90 tablet 3    lancets misc Use daily to check sugar 100 each 3    Lasix 40 mg tablet Take 1 tablet (40 mg) by mouth. On Wednesday and Sunday if needed for edema      lisinopril 40 mg tablet TAKE 1 TABLET DAILY 90 tablet 3    metFORMIN (Glucophage) 1,000 mg tablet 1 po bid      nystatin (Mycostatin) 100,000 unit/gram powder Apply 3-4 times daily prn rash 60 g 2    tamsulosin (Flomax) 0.4 mg 24 hr capsule TAKE 1 CAPSULE DAILY 90 capsule 3    neomycin-polymyxin-HC (Cortisporin) otic solution Administer 2 drops into the right ear 4 times a day. (Patient not taking: Reported on 7/15/2025) 10 mL 0    oxyCODONE-acetaminophen (Percocet) 5-325 mg tablet Take 1 tablet by mouth every 6 hours if needed for pain. (Patient not taking: Reported on 7/15/2025)       No current facility-administered medications on file prior to visit.

## 2025-07-16 ENCOUNTER — TELEPHONE (OUTPATIENT)
Dept: PRIMARY CARE | Facility: CLINIC | Age: 85
End: 2025-07-16
Payer: MEDICARE

## 2025-07-16 NOTE — TELEPHONE ENCOUNTER
"Received message from Beth, nurse  with [facility] \"I just want to make sure we are following Dr. Waggoner's plan of care.  The patient said they are not wearing the compression stockings and will not wear them.\"  Call back: [phone]  "

## 2025-08-05 ENCOUNTER — TELEPHONE (OUTPATIENT)
Dept: PRIMARY CARE | Facility: CLINIC | Age: 85
End: 2025-08-05
Payer: MEDICARE

## 2025-08-05 DIAGNOSIS — E11.65 TYPE 2 DIABETES MELLITUS WITH HYPERGLYCEMIA, UNSPECIFIED WHETHER LONG TERM INSULIN USE (MULTI): Primary | ICD-10-CM

## 2025-08-08 ENCOUNTER — TELEPHONE (OUTPATIENT)
Dept: PRIMARY CARE | Facility: CLINIC | Age: 85
End: 2025-08-08
Payer: MEDICARE

## 2025-09-04 ENCOUNTER — PATIENT OUTREACH (OUTPATIENT)
Dept: PRIMARY CARE | Facility: CLINIC | Age: 85
End: 2025-09-04
Payer: MEDICARE

## 2025-09-04 DIAGNOSIS — Z00.00 WELL ADULT EXAM: ICD-10-CM
